# Patient Record
Sex: MALE | Race: OTHER | Employment: FULL TIME | URBAN - METROPOLITAN AREA
[De-identification: names, ages, dates, MRNs, and addresses within clinical notes are randomized per-mention and may not be internally consistent; named-entity substitution may affect disease eponyms.]

---

## 2023-04-18 ENCOUNTER — APPOINTMENT (OUTPATIENT)
Dept: CT IMAGING | Age: 33
End: 2023-04-18
Attending: EMERGENCY MEDICINE

## 2023-04-18 ENCOUNTER — HOSPITAL ENCOUNTER (EMERGENCY)
Age: 33
Discharge: HOME | End: 2023-04-18
Attending: EMERGENCY MEDICINE

## 2023-04-18 VITALS
WEIGHT: 224 LBS | DIASTOLIC BLOOD PRESSURE: 84 MMHG | HEIGHT: 75 IN | TEMPERATURE: 98 F | SYSTOLIC BLOOD PRESSURE: 139 MMHG | HEART RATE: 64 BPM | OXYGEN SATURATION: 99 % | RESPIRATION RATE: 16 BRPM | BODY MASS INDEX: 27.85 KG/M2

## 2023-04-18 DIAGNOSIS — E01.0 THYROMEGALY: ICD-10-CM

## 2023-04-18 DIAGNOSIS — R22.1 NECK SWELLING: Primary | ICD-10-CM

## 2023-04-18 LAB
ALBUMIN SERPL-MCNC: 4.2 G/DL (ref 3.5–5)
ALBUMIN/GLOB SERPL: 1.1 (ref 1.1–2.2)
ALP SERPL-CCNC: 88 U/L (ref 45–117)
ALT SERPL-CCNC: 100 U/L (ref 12–78)
ANION GAP SERPL CALC-SCNC: 3 MMOL/L (ref 5–15)
AST SERPL-CCNC: 32 U/L (ref 15–37)
BASOPHILS # BLD: 0.1 K/UL (ref 0–0.1)
BASOPHILS NFR BLD: 1 % (ref 0–1)
BILIRUB SERPL-MCNC: 0.4 MG/DL (ref 0.2–1)
BUN SERPL-MCNC: 17 MG/DL (ref 6–20)
BUN/CREAT SERPL: 15 (ref 12–20)
CALCIUM SERPL-MCNC: 9.3 MG/DL (ref 8.5–10.1)
CHLORIDE SERPL-SCNC: 107 MMOL/L (ref 97–108)
CO2 SERPL-SCNC: 29 MMOL/L (ref 21–32)
COMMENT, HOLDF: NORMAL
CREAT SERPL-MCNC: 1.11 MG/DL (ref 0.7–1.3)
DIFFERENTIAL METHOD BLD: NORMAL
EOSINOPHIL # BLD: 0.1 K/UL (ref 0–0.4)
EOSINOPHIL NFR BLD: 1 % (ref 0–7)
ERYTHROCYTE [DISTWIDTH] IN BLOOD BY AUTOMATED COUNT: 12.2 % (ref 11.5–14.5)
GLOBULIN SER CALC-MCNC: 3.7 G/DL (ref 2–4)
GLUCOSE SERPL-MCNC: 98 MG/DL (ref 65–100)
HCT VFR BLD AUTO: 44.1 % (ref 36.6–50.3)
HETEROPH AB BLD QL IA: NEGATIVE
HGB BLD-MCNC: 15 G/DL (ref 12.1–17)
IMM GRANULOCYTES # BLD AUTO: 0 K/UL (ref 0–0.04)
IMM GRANULOCYTES NFR BLD AUTO: 0 % (ref 0–0.5)
LYMPHOCYTES # BLD: 2.7 K/UL (ref 0.8–3.5)
LYMPHOCYTES NFR BLD: 36 % (ref 12–49)
MCH RBC QN AUTO: 29.1 PG (ref 26–34)
MCHC RBC AUTO-ENTMCNC: 34 G/DL (ref 30–36.5)
MCV RBC AUTO: 85.5 FL (ref 80–99)
MONOCYTES # BLD: 0.6 K/UL (ref 0–1)
MONOCYTES NFR BLD: 9 % (ref 5–13)
NEUTS SEG # BLD: 3.9 K/UL (ref 1.8–8)
NEUTS SEG NFR BLD: 53 % (ref 32–75)
NRBC # BLD: 0 K/UL (ref 0–0.01)
NRBC BLD-RTO: 0 PER 100 WBC
PLATELET # BLD AUTO: 205 K/UL (ref 150–400)
PMV BLD AUTO: 10.9 FL (ref 8.9–12.9)
POTASSIUM SERPL-SCNC: 3.8 MMOL/L (ref 3.5–5.1)
PROT SERPL-MCNC: 7.9 G/DL (ref 6.4–8.2)
RBC # BLD AUTO: 5.16 M/UL (ref 4.1–5.7)
SAMPLES BEING HELD,HOLD: NORMAL
SODIUM SERPL-SCNC: 139 MMOL/L (ref 136–145)
T4 FREE SERPL-MCNC: 0.8 NG/DL (ref 0.8–1.5)
TSH SERPL DL<=0.05 MIU/L-ACNC: 1.64 UIU/ML (ref 0.36–3.74)
WBC # BLD AUTO: 7.3 K/UL (ref 4.1–11.1)

## 2023-04-18 PROCEDURE — 84439 ASSAY OF FREE THYROXINE: CPT

## 2023-04-18 PROCEDURE — 74011250636 HC RX REV CODE- 250/636: Performed by: EMERGENCY MEDICINE

## 2023-04-18 PROCEDURE — 80053 COMPREHEN METABOLIC PANEL: CPT

## 2023-04-18 PROCEDURE — 74011000636 HC RX REV CODE- 636: Performed by: EMERGENCY MEDICINE

## 2023-04-18 PROCEDURE — 96361 HYDRATE IV INFUSION ADD-ON: CPT

## 2023-04-18 PROCEDURE — 99285 EMERGENCY DEPT VISIT HI MDM: CPT

## 2023-04-18 PROCEDURE — 84443 ASSAY THYROID STIM HORMONE: CPT

## 2023-04-18 PROCEDURE — 96360 HYDRATION IV INFUSION INIT: CPT

## 2023-04-18 PROCEDURE — 86308 HETEROPHILE ANTIBODY SCREEN: CPT

## 2023-04-18 PROCEDURE — 85025 COMPLETE CBC W/AUTO DIFF WBC: CPT

## 2023-04-18 PROCEDURE — 70491 CT SOFT TISSUE NECK W/DYE: CPT

## 2023-04-18 RX ADMIN — IOPAMIDOL 100 ML: 755 INJECTION, SOLUTION INTRAVENOUS at 19:34

## 2023-04-18 RX ADMIN — SODIUM CHLORIDE 1000 ML: 9 INJECTION, SOLUTION INTRAVENOUS at 18:37

## 2023-04-18 NOTE — ED TRIAGE NOTES
Pt arrives to the ER for complaints of swelling around neck that started about six months ago and reports about four days ago the swelling got worse. Denies difficulty swallowing or shortness of breath. Denies any PMH. DISPLAY PLAN FREE TEXT DISPLAY PLAN FREE TEXT DISPLAY PLAN FREE TEXT

## 2023-04-18 NOTE — ED PROVIDER NOTES
28-year-old male presents emergency department chief complaint Only. Patient states he started to notice the swelling proximally 6 months ago with some occasional discomfort. He states he had sudden onset of worsening of neck swelling over the last 4 days. He states he is able to swallow but is mildly painful due to discomfort. Also reports fatigue. Denies any fever or chills. Denies any known sick contacts. Reports that he is normally healthy    The history is provided by the patient. Neck Swelling   This is a new problem. The current episode started more than 2 days ago. The problem occurs constantly. The problem has been gradually worsening. The pain is associated with nothing. There has been no fever. The pain is present in the generalized neck. The quality of the pain is described as aching. Pertinent negatives include no weakness. No past medical history on file. No past surgical history on file. No family history on file. Social History     Socioeconomic History    Marital status:      Spouse name: Not on file    Number of children: Not on file    Years of education: Not on file    Highest education level: Not on file   Occupational History    Not on file   Tobacco Use    Smoking status: Not on file    Smokeless tobacco: Not on file   Substance and Sexual Activity    Alcohol use: Not on file    Drug use: Not on file    Sexual activity: Not on file   Other Topics Concern    Not on file   Social History Narrative    Not on file     Social Determinants of Health     Financial Resource Strain: Not on file   Food Insecurity: Not on file   Transportation Needs: Not on file   Physical Activity: Not on file   Stress: Not on file   Social Connections: Not on file   Intimate Partner Violence: Not on file   Housing Stability: Not on file         ALLERGIES: Patient has no known allergies. Review of Systems   Constitutional:  Positive for fatigue. Negative for activity change and fever. HENT:  Negative for sinus pressure, sinus pain and sore throat. Respiratory:  Negative for chest tightness and shortness of breath. Gastrointestinal:  Negative for abdominal pain. Endocrine: Negative for cold intolerance and heat intolerance. Musculoskeletal:  Negative for myalgias. Skin:  Negative for wound. Neurological:  Negative for weakness. Psychiatric/Behavioral:  The patient is not nervous/anxious. All other systems reviewed and are negative. Vitals:    04/18/23 1800 04/18/23 1801   BP:  139/84   Pulse:  64   Resp:  16   Temp: 98 °F (36.7 °C)    SpO2:  99%   Weight:  101.6 kg (224 lb)   Height:  6' 3\" (1.905 m)            Physical Exam  Vitals and nursing note reviewed. Constitutional:       Comments: Appears fatigued and slightly ill   HENT:      Head: Normocephalic and atraumatic. Nose: Nose normal.      Mouth/Throat:      Mouth: Mucous membranes are moist.   Eyes:      Extraocular Movements: Extraocular movements intact. Pupils: Pupils are equal, round, and reactive to light. Neck:      Comments: Questionable enlarged thyroid  Cardiovascular:      Rate and Rhythm: Normal rate and regular rhythm. Pulmonary:      Effort: Pulmonary effort is normal.      Breath sounds: Normal breath sounds. Abdominal:      General: Abdomen is flat. Bowel sounds are normal.   Musculoskeletal:      Cervical back: Tenderness present. No rigidity. Lymphadenopathy:      Cervical: Cervical adenopathy present. Skin:     Coloration: Skin is pale. Neurological:      General: No focal deficit present. Medical Decision Making  Amount and/or Complexity of Data Reviewed  Labs: ordered. Radiology: ordered. Risk  Prescription drug management. 28-year-old male presents to the emergency department chief complaint of neck swelling for last 1 month. He reports occasional fatigue but denies any recent infection. He normally is healthy. Physical exam appears somewhat fatigued. Anterior neck appears slightly swollen with possible adenopathy and enlarged thyroid. CBC is normal, chemistry panel is normal except for elevated GGT, TSH is normal, free T4 is pending, mononucleosis is negative. CT scan with contrast showed no adenopathy, does show thyromegaly with soft tissue nodules at the thoracic inlet. Patient given IV fluids and is resting comfortably. He was provided information on clinics in the area as well as family practice. He was advised to follow-up for his heart thyroid.        Procedures

## 2023-04-19 NOTE — DISCHARGE INSTRUCTIONS
[facility] SYSTEMS Departments     For adult and child immunizations, family planning, TB screening, STD testing and women's health services. [facility]: [place] [phone]      [facility] 25   [address]   [address]   170 [facility]: Cephus Ormond 200 [facility] [address] [phone]      [address]          [address] [place] [address]     For primary care services, woman and child wellness, and some clinics providing specialty care. [facility] -- [address] [facility] [phone]   411 [facility] 200 [facility] 3614 [facility] [phone]   339 [facility] [address] [phone]   [address]  [employer] [address] [place] [address] 5850  Community  [phone]   [address] [phone]   [facility] 81 [facility] [phone]   [facility] [address] [place] [address] [phone]   [facility]: [facility] [address], [address] [place] [address], [address] [phone]     [facility] [address] [facility] [address] [phone]   [facility] 5850  Community  [phone]   [employer]  [address], [place] [address] ([email or web address]) [phone]         Sexual Health/Woman Wellness Clinics    For STD/HIV testing and treatment, pregnancy testing and services, men's health, birth control services, LGBT services, and hepatitis/HPV vaccine services. [facility] for [place] [employer] 201 [address]. [facility] [address] [facility] 1579 [address] [phone]   [facility] [address], 5th floor [phone]   Pregnancy 3928 [facility] [address] for Women [facility] NEREYDA Mckeon [phone]         Specialty Service 1701 [facility]   [phone]   [place]   [phone]   Women, Infant and Children's Services: [address] [phone]       600 [facility]   [phone]   [address]   [facility]     [phone]   [address] Crisis   [address] [phone]       Local Primary Care Physicians  [facility] [phone]  MD Jalil Vazquez MD Angela Engman, MD [facility] [phone]  Nicholas Beatty, [facility]  Bianca Bennett, MD Ollie William, MD Macy Shankar [place] [address] [phone]  MD Pb Cooley MD [address] [facility] [phone]  MD Marielena Butts MD Mirian Labella, MD Christa Gupta MD   [facility] [phone]  JEANNEJEANNE PAREDES, MD López Chang MD  [facility], NP [address] [phone]  Jacinta Alaniz, MD Lorrie Grewal MD Rejeana Sousa, MD Earnesteen Burdock, MD Arnie Dryer, MD Albertina Naval, MD   33 [address] [facility]  Jefferson Quiroz MD [address] [facility] [phone]  Kylah Sifuentes, MD Telma Coleman, NP  MD Mary Kate Helton MD Hinda Raman, MD Guera Sadler, MD Omega Quintana MD   5057 [facility] [phone]  MD Ender Brooks President, [facility]  Azalea Libman, SERINA Harris, MD Aleshia Neville MD Roise Coons, MD [facility] [phone]  MD Dominguez Ridley MD Marleta Ann, MD Wynelle Carry, MD Baldemar Riis, MD   [address] [phone]  TreMD Jim Gayle MD [place] [phone]  MD Nicholas Dukes MD M[address] Boogie Strickland, [address] [facility] [phone]  Leonor Reich, MD Jerald Parker, MD Darrel Kearney, MD Cailin Garay, MD Chandra Schmitt, MD Leslie Estrada, NP  Arthur Ann MD 1619 [facility]   [phone]  MD Drew Weaver, MD Ras Adan MD   2102 [facility] [phone]  [facility], MD Durel Paget, P  Jazmine Barrow, PAJESS Barrow, FNP  Roxana Trejo, SUHAS Welch, MD Cassia Ruiz, NP   Lucio Robles, DO Miscellaneous:  Tg Alvarez [phone]

## 2023-09-06 NOTE — PROGRESS NOTES
Sendy Chandra is an 28 y.o. male who presents to establish care, and  Chief Complaint   Patient presents with    Establish Care     Ongoing swelling/ lump around the neck since April,  c/o pressure denies pain or trouble swallowing      Patient was previously receiving care at: just going to ER    Medical history significant for:    Denies any pmhx including HTN, DM, CV disease    Current complaints include:    #Neck swelling: Noticed in April and went to ER 4/18/23 and had normal labs including TSH and T4. CT neck at that time with \"Thyromegaly. There are soft tissue nodules at the thoracic inlet which may represent thyroid nodules or lymph nodes. \" Since then, states neck pressure is worse and size of thyroid is more. Sometimes has mild sensation of difficulty breathing d/t pressure in neck. Endorses diaphoresis at night as weight loss of 3-5lb since April. Denies palpitations, weight loss, diarrhea, difficulty swallowing. Denies fhx of thyroid disease or cancers. Preventive Care  Due for HIV, hep c screen    Social Hx  Alcohol: none  Tobacco: none  Illicit drug use: none    Current Medications  Current medications include:   No current outpatient medications on file. No current facility-administered medications for this visit. Allergies  No Known Allergies    Past Medical History  No past medical history on file. Past Surgical History   No past surgical history on file. Family History  No family history on file.     Social History  Social History     Socioeconomic History    Marital status:      Spouse name: Not on file    Number of children: Not on file    Years of education: Not on file    Highest education level: Not on file   Occupational History    Not on file   Tobacco Use    Smoking status: Never    Smokeless tobacco: Never   Substance and Sexual Activity    Alcohol use: Never    Drug use: Never    Sexual activity: Not on file   Other Topics Concern    Not on file   Social

## 2023-09-07 ENCOUNTER — OFFICE VISIT (OUTPATIENT)
Age: 33
End: 2023-09-07

## 2023-09-07 VITALS
TEMPERATURE: 98.2 F | HEART RATE: 98 BPM | HEIGHT: 72 IN | SYSTOLIC BLOOD PRESSURE: 135 MMHG | DIASTOLIC BLOOD PRESSURE: 83 MMHG | WEIGHT: 226 LBS | OXYGEN SATURATION: 100 % | BODY MASS INDEX: 30.61 KG/M2

## 2023-09-07 DIAGNOSIS — Z11.4 SCREENING FOR HIV WITHOUT PRESENCE OF RISK FACTORS: ICD-10-CM

## 2023-09-07 DIAGNOSIS — Z76.89 ENCOUNTER TO ESTABLISH CARE: ICD-10-CM

## 2023-09-07 DIAGNOSIS — Z11.59 NEED FOR HEPATITIS C SCREENING TEST: ICD-10-CM

## 2023-09-07 DIAGNOSIS — E01.0 THYROMEGALY: Primary | ICD-10-CM

## 2023-09-07 DIAGNOSIS — Z23 NEED FOR DIPHTHERIA-TETANUS-PERTUSSIS (TDAP) VACCINE: ICD-10-CM

## 2023-09-07 PROCEDURE — 90715 TDAP VACCINE 7 YRS/> IM: CPT

## 2023-09-07 PROCEDURE — 99204 OFFICE O/P NEW MOD 45 MIN: CPT

## 2023-09-07 ASSESSMENT — PATIENT HEALTH QUESTIONNAIRE - PHQ9
SUM OF ALL RESPONSES TO PHQ9 QUESTIONS 1 & 2: 4
1. LITTLE INTEREST OR PLEASURE IN DOING THINGS: 2
SUM OF ALL RESPONSES TO PHQ QUESTIONS 1-9: 4
2. FEELING DOWN, DEPRESSED OR HOPELESS: 2

## 2023-09-08 LAB
ALBUMIN SERPL-MCNC: 4.2 G/DL (ref 3.5–5)
ALBUMIN/GLOB SERPL: 1.1 (ref 1.1–2.2)
ALP SERPL-CCNC: 95 U/L (ref 45–117)
ALT SERPL-CCNC: 85 U/L (ref 12–78)
ANION GAP SERPL CALC-SCNC: 5 MMOL/L (ref 5–15)
AST SERPL-CCNC: 24 U/L (ref 15–37)
BILIRUB SERPL-MCNC: 0.3 MG/DL (ref 0.2–1)
BUN SERPL-MCNC: 12 MG/DL (ref 6–20)
BUN/CREAT SERPL: 11 (ref 12–20)
CALCIUM SERPL-MCNC: 9.3 MG/DL (ref 8.5–10.1)
CHLORIDE SERPL-SCNC: 107 MMOL/L (ref 97–108)
CO2 SERPL-SCNC: 29 MMOL/L (ref 21–32)
CREAT SERPL-MCNC: 1.07 MG/DL (ref 0.7–1.3)
ERYTHROCYTE [DISTWIDTH] IN BLOOD BY AUTOMATED COUNT: 12.6 % (ref 11.5–14.5)
GLOBULIN SER CALC-MCNC: 3.7 G/DL (ref 2–4)
GLUCOSE SERPL-MCNC: 97 MG/DL (ref 65–100)
HCT VFR BLD AUTO: 46.2 % (ref 36.6–50.3)
HCV AB SER IA-ACNC: 0.1 INDEX
HCV AB SERPL QL IA: NONREACTIVE
HGB BLD-MCNC: 15 G/DL (ref 12.1–17)
HIV 1+2 AB+HIV1 P24 AG SERPL QL IA: NONREACTIVE
HIV 1/2 RESULT COMMENT: NORMAL
MCH RBC QN AUTO: 28.5 PG (ref 26–34)
MCHC RBC AUTO-ENTMCNC: 32.5 G/DL (ref 30–36.5)
MCV RBC AUTO: 87.8 FL (ref 80–99)
NRBC # BLD: 0 K/UL (ref 0–0.01)
NRBC BLD-RTO: 0 PER 100 WBC
PLATELET # BLD AUTO: 232 K/UL (ref 150–400)
PMV BLD AUTO: 11 FL (ref 8.9–12.9)
POTASSIUM SERPL-SCNC: 3.9 MMOL/L (ref 3.5–5.1)
PROT SERPL-MCNC: 7.9 G/DL (ref 6.4–8.2)
RBC # BLD AUTO: 5.26 M/UL (ref 4.1–5.7)
SODIUM SERPL-SCNC: 141 MMOL/L (ref 136–145)
T4 FREE SERPL-MCNC: 0.8 NG/DL (ref 0.8–1.5)
TSH SERPL DL<=0.05 MIU/L-ACNC: 1.1 UIU/ML (ref 0.36–3.74)
WBC # BLD AUTO: 7.7 K/UL (ref 4.1–11.1)

## 2023-09-10 LAB
THYROGLOB AB SERPL-ACNC: <1 IU/ML (ref 0–0.9)
THYROPEROXIDASE AB SERPL-ACNC: 12 IU/ML (ref 0–34)

## 2023-09-22 ENCOUNTER — HOSPITAL ENCOUNTER (OUTPATIENT)
Facility: HOSPITAL | Age: 33
Discharge: HOME | End: 2023-09-22

## 2023-09-22 DIAGNOSIS — E01.0 THYROMEGALY: ICD-10-CM

## 2023-09-22 PROCEDURE — 76536 US EXAM OF HEAD AND NECK: CPT

## 2023-09-28 ENCOUNTER — TELEPHONE (OUTPATIENT)
Age: 33
End: 2023-09-28

## 2023-09-28 NOTE — TELEPHONE ENCOUNTER
Pt has requested a returned phone call to inform him of his U/S results and  Rx for pain medication. He also stated that the Endocrinologist that you referred him to cannot see him until next year. He is requested a new referral to another doctor. This writer attempted to schedule pt, several times, to schedule his f/u appt. Pt refused and stated \"just give doctor my message. \"    Thank you

## 2023-10-03 ENCOUNTER — TELEPHONE (OUTPATIENT)
Age: 33
End: 2023-10-03

## 2023-10-10 ENCOUNTER — OFFICE VISIT (OUTPATIENT)
Age: 33
End: 2023-10-10

## 2023-10-10 VITALS
DIASTOLIC BLOOD PRESSURE: 60 MMHG | BODY MASS INDEX: 28.14 KG/M2 | WEIGHT: 226.3 LBS | HEART RATE: 79 BPM | SYSTOLIC BLOOD PRESSURE: 124 MMHG | HEIGHT: 75 IN

## 2023-10-10 DIAGNOSIS — E04.9 GOITER: ICD-10-CM

## 2023-10-10 DIAGNOSIS — E04.1 THYROID NODULE: Primary | ICD-10-CM

## 2023-10-10 PROCEDURE — 99204 OFFICE O/P NEW MOD 45 MIN: CPT | Performed by: GENERAL ACUTE CARE HOSPITAL

## 2023-10-10 NOTE — PATIENT INSTRUCTIONS
Plan is to have the thyroid ultrasound report back and based on the results will decide on the next steps in management

## 2023-10-10 NOTE — PROGRESS NOTES
REFERRED BY: Yaneth Parker MD     REASON: Evaluation of thyroid nodule    CHIEF COMPLAINT: Thyroid nodule    HISTORY OF PRESENT ILLNESS:   Srinath Rogers is a 28 y.o. male with a PMHx as noted below who was referred to our endocrinology clinic for evaluation of a thyroid nodule. Patient describes that since 04/2023 he developed compressive symptoms, although he noticed neck sweeling for long time but he is not sure exactly when, was referred by his PCP after completing thyroid ultrasound \" report says in process and not completed yet\". He went to ER for diaphoresis and had CT showing retrosternal thyromegaly. He has dysphagia and neck pressure with some deep breathing difficulty that he feels is progressing. Thyroid function tests normal  Never on thyroid medications before  Family history is not significant for thyroid nodules or thyroid cancers. Patient denies any history of radiation exposure. They deny any dysphagia or dyspnea. Patient denies symptoms of hyper or hypothyroidism. Thyroid Ultrasound 09/22/2023  FINDINGS: Routine ultrasound imaging of the thyroid gland was performed. The  right thyroid lobe measures 5.4 x 2.4 x 2.9 cm. The left thyroid lobe measures  10.4 x 5.9 x 4.9 cm. The thyroid isthmus measures 33 mm in thickness. The  thyroid is heterogenous in appearance. IMPRESSION:  Significant diffuse thyromegaly with heterogenous thyroid parenchyma. Review of most recent thyroid function:  Lab Results   Component Value Date    TSH 1.64 04/18/2023      TSILT = Thyroid stimulating antibodies  TMCLT = TPO antibodies  T3LT = Total T3 levels    At this time they would like to further investigate the nature of the thyroid nodule. PAST HISTORY:   No past medical history on file. No past surgical history on file. MEDICATIONS   No current outpatient medications on file. FAMILY HISTORY:  No family history on file.     REVIEW OF SYSTEMS:   Complete ROS assessed and noted

## 2023-10-24 ENCOUNTER — TELEPHONE (OUTPATIENT)
Age: 33
End: 2023-10-24

## 2023-10-24 DIAGNOSIS — E04.1 THYROID NODULE: ICD-10-CM

## 2023-10-24 DIAGNOSIS — E04.9 GOITER: Primary | ICD-10-CM

## 2023-10-24 NOTE — TELEPHONE ENCOUNTER
Spoke with mr Arlette Jett today and discussed with him the PACS images of his thyroid most notably the 10.41 cm left thyroid lobe and isthmus 3.33 cm and right lobe 5.48 cm. Advised due to the compressive symptoms he is having and the rate of growth of his left thyroid gland, I recommend he have a total thyroidectomy, and referral given to Dr Angle Tang office, patient indicates understanding and agrees with plan.  To follow up with me after surgical consultation

## 2023-10-24 NOTE — TELEPHONE ENCOUNTER
Spoke with [facility] on 10/10/2023 about report still \"in process\", gave my direct phone, no response . I called today and spoke with ms Saba Galloway (radiology manager) and she indicates it will be resolved today and report will be in the system today.

## 2023-11-06 ENCOUNTER — TELEPHONE (OUTPATIENT)
Age: 33
End: 2023-11-06

## 2023-11-06 NOTE — TELEPHONE ENCOUNTER
Lm to see if patient wants to be put back on md schedule was rescheduled to another provider currently

## 2023-11-08 ENCOUNTER — PREP FOR PROCEDURE (OUTPATIENT)
Age: 33
End: 2023-11-08

## 2023-11-08 ENCOUNTER — OFFICE VISIT (OUTPATIENT)
Age: 33
End: 2023-11-08

## 2023-11-08 VITALS
OXYGEN SATURATION: 96 % | RESPIRATION RATE: 16 BRPM | WEIGHT: 226 LBS | DIASTOLIC BLOOD PRESSURE: 78 MMHG | TEMPERATURE: 98.6 F | SYSTOLIC BLOOD PRESSURE: 128 MMHG | BODY MASS INDEX: 28.1 KG/M2 | HEIGHT: 75 IN | HEART RATE: 72 BPM

## 2023-11-08 DIAGNOSIS — E01.0 THYROMEGALY: Primary | ICD-10-CM

## 2023-11-08 DIAGNOSIS — E01.0 THYROMEGALY: ICD-10-CM

## 2023-11-08 PROCEDURE — 99204 OFFICE O/P NEW MOD 45 MIN: CPT | Performed by: SURGERY

## 2023-11-08 ASSESSMENT — ENCOUNTER SYMPTOMS
ABDOMINAL PAIN: 0
EYE PAIN: 0
DIARRHEA: 0
SHORTNESS OF BREATH: 0
BACK PAIN: 0
BLOOD IN STOOL: 0
VOMITING: 0
STRIDOR: 0
CONSTIPATION: 0
NAUSEA: 0
SORE THROAT: 0
COUGH: 0
WHEEZING: 0

## 2023-11-08 ASSESSMENT — PATIENT HEALTH QUESTIONNAIRE - PHQ9
SUM OF ALL RESPONSES TO PHQ9 QUESTIONS 1 & 2: 2
1. LITTLE INTEREST OR PLEASURE IN DOING THINGS: 1
SUM OF ALL RESPONSES TO PHQ QUESTIONS 1-9: 2
SUM OF ALL RESPONSES TO PHQ QUESTIONS 1-9: 2
2. FEELING DOWN, DEPRESSED OR HOPELESS: 1
SUM OF ALL RESPONSES TO PHQ QUESTIONS 1-9: 2
SUM OF ALL RESPONSES TO PHQ QUESTIONS 1-9: 2

## 2023-11-08 NOTE — PROGRESS NOTES
Subjective:      Patient ID: Vianney Vickers is a 28 y.o. male who comes in for consultation by Monique Harley MD and Anay Bradford MD for an enlarging thyroid      Chief Complaint   Patient presents with    New Patient     Seen at the request of  to discuss thyroidectomy       HPI    He has had neck swelling for a long time but unclear exactly. In April 2023 it was getting larger and he had sweats went to the ER and had a CT noting thyromegaly. He has dysphagia and neck pressure with some breathing difficulty that was getting worse and was seen in September 2023 by Monique Harley MD and referred to Anay Bradford MD.   Thyroid function tests were ok. US noted a 10+cm left lobe and enlarged right lobe as well (see report below). He denies palpitations, voice changes, radiation exposure or family hx thyroid issues. He does use IV vitamins from the [employer] but unclear what is in it. History reviewed. No pertinent past medical history. History reviewed. No pertinent surgical history. Family History   Problem Relation Age of Onset    Diabetes Father      Social History     Tobacco Use    Smoking status: Never    Smokeless tobacco: Never   Vaping Use    Vaping Use: Never used   Substance Use Topics    Alcohol use: Never    Drug use: Never     No current outpatient medications on file. No current facility-administered medications for this visit. No Known Allergies      Review of Systems   Constitutional:  Positive for diaphoresis. Negative for chills, fatigue, fever and unexpected weight change. HENT:  Negative for congestion, ear pain and sore throat. Eyes:  Negative for pain. Respiratory:  Negative for cough, shortness of breath, wheezing and stridor. Cardiovascular:  Negative for chest pain, palpitations and leg swelling. Gastrointestinal:  Negative for abdominal pain, blood in stool, constipation, diarrhea, nausea and vomiting. Endocrine: Negative for polydipsia.

## 2023-11-14 PROBLEM — E01.0 THYROMEGALY: Status: ACTIVE | Noted: 2023-11-08

## 2023-11-14 PROBLEM — E04.9 GOITER: Status: ACTIVE | Noted: 2023-09-07

## 2023-12-13 NOTE — PERIOP NOTE
services #[phone] used to call time of arrival of 5:45am for surgery on 12/14/23. Patient verbalized understanding.

## 2023-12-14 ENCOUNTER — ANESTHESIA (OUTPATIENT)
Facility: HOSPITAL | Age: 33
End: 2023-12-14
Payer: COMMERCIAL

## 2023-12-14 ENCOUNTER — HOSPITAL ENCOUNTER (OUTPATIENT)
Facility: HOSPITAL | Age: 33
Discharge: HOME | End: 2023-12-15
Attending: SURGERY | Admitting: SURGERY
Payer: COMMERCIAL

## 2023-12-14 ENCOUNTER — ANESTHESIA EVENT (OUTPATIENT)
Facility: HOSPITAL | Age: 33
End: 2023-12-14
Payer: COMMERCIAL

## 2023-12-14 DIAGNOSIS — E01.0 THYROMEGALY: Primary | ICD-10-CM

## 2023-12-14 LAB
CALCIUM SERPL-MCNC: 8.4 MG/DL (ref 8.5–10.1)
CALCIUM SERPL-MCNC: 9.1 MG/DL (ref 8.5–10.1)

## 2023-12-14 PROCEDURE — 36415 COLL VENOUS BLD VENIPUNCTURE: CPT

## 2023-12-14 PROCEDURE — 3600000004 HC SURGERY LEVEL 4 BASE: Performed by: SURGERY

## 2023-12-14 PROCEDURE — 2580000003 HC RX 258: Performed by: SURGERY

## 2023-12-14 PROCEDURE — 2500000003 HC RX 250 WO HCPCS: Performed by: SURGERY

## 2023-12-14 PROCEDURE — 2580000003 HC RX 258: Performed by: ANESTHESIOLOGY

## 2023-12-14 PROCEDURE — 2709999900 HC NON-CHARGEABLE SUPPLY: Performed by: SURGERY

## 2023-12-14 PROCEDURE — 3700000001 HC ADD 15 MINUTES (ANESTHESIA): Performed by: SURGERY

## 2023-12-14 PROCEDURE — 6360000002 HC RX W HCPCS: Performed by: SURGERY

## 2023-12-14 PROCEDURE — 3600000014 HC SURGERY LEVEL 4 ADDTL 15MIN: Performed by: SURGERY

## 2023-12-14 PROCEDURE — 6360000002 HC RX W HCPCS: Performed by: REGISTERED NURSE

## 2023-12-14 PROCEDURE — 6370000000 HC RX 637 (ALT 250 FOR IP): Performed by: SURGERY

## 2023-12-14 PROCEDURE — 2500000003 HC RX 250 WO HCPCS: Performed by: REGISTERED NURSE

## 2023-12-14 PROCEDURE — 2580000003 HC RX 258: Performed by: REGISTERED NURSE

## 2023-12-14 PROCEDURE — 82310 ASSAY OF CALCIUM: CPT

## 2023-12-14 PROCEDURE — 7100000001 HC PACU RECOVERY - ADDTL 15 MIN: Performed by: SURGERY

## 2023-12-14 PROCEDURE — 2720000010 HC SURG SUPPLY STERILE: Performed by: SURGERY

## 2023-12-14 PROCEDURE — 3700000000 HC ANESTHESIA ATTENDED CARE: Performed by: SURGERY

## 2023-12-14 PROCEDURE — P9045 ALBUMIN (HUMAN), 5%, 250 ML: HCPCS | Performed by: REGISTERED NURSE

## 2023-12-14 PROCEDURE — 7100000000 HC PACU RECOVERY - FIRST 15 MIN: Performed by: SURGERY

## 2023-12-14 RX ORDER — CALCIUM CARBONATE 500 MG/1
500 TABLET, CHEWABLE ORAL 2 TIMES DAILY
Status: DISCONTINUED | OUTPATIENT
Start: 2023-12-14 | End: 2023-12-15 | Stop reason: HOSPADM

## 2023-12-14 RX ORDER — OXYCODONE HYDROCHLORIDE 5 MG/1
5 TABLET ORAL EVERY 4 HOURS PRN
Status: DISCONTINUED | OUTPATIENT
Start: 2023-12-14 | End: 2023-12-15 | Stop reason: HOSPADM

## 2023-12-14 RX ORDER — SODIUM CHLORIDE 9 MG/ML
INJECTION, SOLUTION INTRAVENOUS PRN
Status: DISCONTINUED | OUTPATIENT
Start: 2023-12-14 | End: 2023-12-15 | Stop reason: HOSPADM

## 2023-12-14 RX ORDER — ALBUMIN, HUMAN INJ 5% 5 %
SOLUTION INTRAVENOUS PRN
Status: DISCONTINUED | OUTPATIENT
Start: 2023-12-14 | End: 2023-12-14 | Stop reason: SDUPTHER

## 2023-12-14 RX ORDER — DEXTROSE MONOHYDRATE 100 MG/ML
INJECTION, SOLUTION INTRAVENOUS CONTINUOUS PRN
Status: DISCONTINUED | OUTPATIENT
Start: 2023-12-14 | End: 2023-12-14 | Stop reason: HOSPADM

## 2023-12-14 RX ORDER — HYDROMORPHONE HYDROCHLORIDE 1 MG/ML
0.5 INJECTION, SOLUTION INTRAMUSCULAR; INTRAVENOUS; SUBCUTANEOUS
Status: DISCONTINUED | OUTPATIENT
Start: 2023-12-14 | End: 2023-12-15 | Stop reason: HOSPADM

## 2023-12-14 RX ORDER — KETAMINE HCL IN NACL, ISO-OSM 100MG/10ML
SYRINGE (ML) INJECTION PRN
Status: DISCONTINUED | OUTPATIENT
Start: 2023-12-14 | End: 2023-12-14 | Stop reason: SDUPTHER

## 2023-12-14 RX ORDER — PROPOFOL 10 MG/ML
INJECTION, EMULSION INTRAVENOUS CONTINUOUS PRN
Status: DISCONTINUED | OUTPATIENT
Start: 2023-12-14 | End: 2023-12-14 | Stop reason: SDUPTHER

## 2023-12-14 RX ORDER — ONDANSETRON 2 MG/ML
4 INJECTION INTRAMUSCULAR; INTRAVENOUS EVERY 6 HOURS PRN
Status: DISCONTINUED | OUTPATIENT
Start: 2023-12-14 | End: 2023-12-15 | Stop reason: HOSPADM

## 2023-12-14 RX ORDER — DEXAMETHASONE SODIUM PHOSPHATE 4 MG/ML
INJECTION, SOLUTION INTRA-ARTICULAR; INTRALESIONAL; INTRAMUSCULAR; INTRAVENOUS; SOFT TISSUE PRN
Status: DISCONTINUED | OUTPATIENT
Start: 2023-12-14 | End: 2023-12-14 | Stop reason: SDUPTHER

## 2023-12-14 RX ORDER — SUCCINYLCHOLINE/SOD CL,ISO/PF 200MG/10ML
SYRINGE (ML) INTRAVENOUS PRN
Status: DISCONTINUED | OUTPATIENT
Start: 2023-12-14 | End: 2023-12-14 | Stop reason: SDUPTHER

## 2023-12-14 RX ORDER — ONDANSETRON 4 MG/1
4 TABLET, ORALLY DISINTEGRATING ORAL EVERY 8 HOURS PRN
Status: DISCONTINUED | OUTPATIENT
Start: 2023-12-14 | End: 2023-12-15 | Stop reason: HOSPADM

## 2023-12-14 RX ORDER — OXYCODONE HYDROCHLORIDE 5 MG/1
5 TABLET ORAL EVERY 6 HOURS PRN
Qty: 12 TABLET | Refills: 0 | Status: SHIPPED | OUTPATIENT
Start: 2023-12-14 | End: 2023-12-17

## 2023-12-14 RX ORDER — ACETAMINOPHEN 325 MG/1
650 TABLET ORAL EVERY 4 HOURS PRN
Status: DISCONTINUED | OUTPATIENT
Start: 2023-12-14 | End: 2023-12-15 | Stop reason: HOSPADM

## 2023-12-14 RX ORDER — PROCHLORPERAZINE EDISYLATE 5 MG/ML
5 INJECTION INTRAMUSCULAR; INTRAVENOUS
Status: DISCONTINUED | OUTPATIENT
Start: 2023-12-14 | End: 2023-12-14 | Stop reason: HOSPADM

## 2023-12-14 RX ORDER — PHENYLEPHRINE HCL IN 0.9% NACL 0.4MG/10ML
SYRINGE (ML) INTRAVENOUS PRN
Status: DISCONTINUED | OUTPATIENT
Start: 2023-12-14 | End: 2023-12-14 | Stop reason: SDUPTHER

## 2023-12-14 RX ORDER — ONDANSETRON 2 MG/ML
INJECTION INTRAMUSCULAR; INTRAVENOUS PRN
Status: DISCONTINUED | OUTPATIENT
Start: 2023-12-14 | End: 2023-12-14 | Stop reason: SDUPTHER

## 2023-12-14 RX ORDER — FENTANYL CITRATE 50 UG/ML
25 INJECTION, SOLUTION INTRAMUSCULAR; INTRAVENOUS EVERY 5 MIN PRN
Status: DISCONTINUED | OUTPATIENT
Start: 2023-12-14 | End: 2023-12-14 | Stop reason: HOSPADM

## 2023-12-14 RX ORDER — IPRATROPIUM BROMIDE AND ALBUTEROL SULFATE 2.5; .5 MG/3ML; MG/3ML
1 SOLUTION RESPIRATORY (INHALATION)
Status: DISCONTINUED | OUTPATIENT
Start: 2023-12-14 | End: 2023-12-14 | Stop reason: HOSPADM

## 2023-12-14 RX ORDER — HYDROMORPHONE HYDROCHLORIDE 1 MG/ML
0.25 INJECTION, SOLUTION INTRAMUSCULAR; INTRAVENOUS; SUBCUTANEOUS
Status: DISCONTINUED | OUTPATIENT
Start: 2023-12-14 | End: 2023-12-15 | Stop reason: HOSPADM

## 2023-12-14 RX ORDER — SODIUM CHLORIDE 0.9 % (FLUSH) 0.9 %
5-40 SYRINGE (ML) INJECTION EVERY 12 HOURS SCHEDULED
Status: DISCONTINUED | OUTPATIENT
Start: 2023-12-14 | End: 2023-12-15 | Stop reason: HOSPADM

## 2023-12-14 RX ORDER — SODIUM CHLORIDE, SODIUM LACTATE, POTASSIUM CHLORIDE, CALCIUM CHLORIDE 600; 310; 30; 20 MG/100ML; MG/100ML; MG/100ML; MG/100ML
INJECTION, SOLUTION INTRAVENOUS CONTINUOUS
Status: DISCONTINUED | OUTPATIENT
Start: 2023-12-14 | End: 2023-12-14 | Stop reason: HOSPADM

## 2023-12-14 RX ORDER — LEVOTHYROXINE SODIUM 0.07 MG/1
150 TABLET ORAL DAILY
Status: DISCONTINUED | OUTPATIENT
Start: 2023-12-15 | End: 2023-12-15 | Stop reason: HOSPADM

## 2023-12-14 RX ORDER — SODIUM CHLORIDE 0.9 % (FLUSH) 0.9 %
5-40 SYRINGE (ML) INJECTION PRN
Status: DISCONTINUED | OUTPATIENT
Start: 2023-12-14 | End: 2023-12-14 | Stop reason: HOSPADM

## 2023-12-14 RX ORDER — LEVOTHYROXINE SODIUM 0.15 MG/1
150 TABLET ORAL DAILY
Qty: 30 TABLET | Refills: 1 | Status: SHIPPED | OUTPATIENT
Start: 2023-12-14 | End: 2024-02-12

## 2023-12-14 RX ORDER — LIDOCAINE HYDROCHLORIDE 20 MG/ML
INJECTION, SOLUTION EPIDURAL; INFILTRATION; INTRACAUDAL; PERINEURAL PRN
Status: DISCONTINUED | OUTPATIENT
Start: 2023-12-14 | End: 2023-12-14 | Stop reason: SDUPTHER

## 2023-12-14 RX ORDER — FENTANYL CITRATE 50 UG/ML
INJECTION, SOLUTION INTRAMUSCULAR; INTRAVENOUS PRN
Status: DISCONTINUED | OUTPATIENT
Start: 2023-12-14 | End: 2023-12-14 | Stop reason: SDUPTHER

## 2023-12-14 RX ORDER — SODIUM CHLORIDE 0.9 % (FLUSH) 0.9 %
5-40 SYRINGE (ML) INJECTION EVERY 12 HOURS SCHEDULED
Status: DISCONTINUED | OUTPATIENT
Start: 2023-12-14 | End: 2023-12-14 | Stop reason: HOSPADM

## 2023-12-14 RX ORDER — SODIUM CHLORIDE 0.9 % (FLUSH) 0.9 %
5-40 SYRINGE (ML) INJECTION PRN
Status: DISCONTINUED | OUTPATIENT
Start: 2023-12-14 | End: 2023-12-15 | Stop reason: HOSPADM

## 2023-12-14 RX ORDER — DEXTROSE MONOHYDRATE, SODIUM CHLORIDE, AND POTASSIUM CHLORIDE 50; 1.49; 4.5 G/1000ML; G/1000ML; G/1000ML
INJECTION, SOLUTION INTRAVENOUS CONTINUOUS
Status: DISCONTINUED | OUTPATIENT
Start: 2023-12-14 | End: 2023-12-15 | Stop reason: HOSPADM

## 2023-12-14 RX ORDER — BUPIVACAINE HYDROCHLORIDE 5 MG/ML
INJECTION, SOLUTION PERINEURAL PRN
Status: DISCONTINUED | OUTPATIENT
Start: 2023-12-14 | End: 2023-12-14 | Stop reason: ALTCHOICE

## 2023-12-14 RX ORDER — MIDAZOLAM HYDROCHLORIDE 1 MG/ML
INJECTION INTRAMUSCULAR; INTRAVENOUS PRN
Status: DISCONTINUED | OUTPATIENT
Start: 2023-12-14 | End: 2023-12-14 | Stop reason: SDUPTHER

## 2023-12-14 RX ORDER — SODIUM CHLORIDE 9 MG/ML
INJECTION, SOLUTION INTRAVENOUS PRN
Status: DISCONTINUED | OUTPATIENT
Start: 2023-12-14 | End: 2023-12-14 | Stop reason: HOSPADM

## 2023-12-14 RX ORDER — ONDANSETRON 2 MG/ML
4 INJECTION INTRAMUSCULAR; INTRAVENOUS
Status: DISCONTINUED | OUTPATIENT
Start: 2023-12-14 | End: 2023-12-14 | Stop reason: HOSPADM

## 2023-12-14 RX ORDER — HYDROMORPHONE HYDROCHLORIDE 1 MG/ML
0.5 INJECTION, SOLUTION INTRAMUSCULAR; INTRAVENOUS; SUBCUTANEOUS EVERY 5 MIN PRN
Status: DISCONTINUED | OUTPATIENT
Start: 2023-12-14 | End: 2023-12-14 | Stop reason: HOSPADM

## 2023-12-14 RX ORDER — HYDROMORPHONE HYDROCHLORIDE 2 MG/ML
INJECTION, SOLUTION INTRAMUSCULAR; INTRAVENOUS; SUBCUTANEOUS PRN
Status: DISCONTINUED | OUTPATIENT
Start: 2023-12-14 | End: 2023-12-14 | Stop reason: SDUPTHER

## 2023-12-14 RX ADMIN — DEXAMETHASONE SODIUM PHOSPHATE 8 MG: 4 INJECTION INTRA-ARTICULAR; INTRALESIONAL; INTRAMUSCULAR; INTRAVENOUS; SOFT TISSUE at 08:05

## 2023-12-14 RX ADMIN — HYDROMORPHONE HYDROCHLORIDE 0.2 MG: 2 INJECTION INTRAMUSCULAR; INTRAVENOUS; SUBCUTANEOUS at 09:08

## 2023-12-14 RX ADMIN — Medication 10 MG: at 09:10

## 2023-12-14 RX ADMIN — HYDROMORPHONE HYDROCHLORIDE 0.5 MG: 1 INJECTION, SOLUTION INTRAMUSCULAR; INTRAVENOUS; SUBCUTANEOUS at 20:37

## 2023-12-14 RX ADMIN — SODIUM CHLORIDE, POTASSIUM CHLORIDE, SODIUM LACTATE AND CALCIUM CHLORIDE: 600; 310; 30; 20 INJECTION, SOLUTION INTRAVENOUS at 07:30

## 2023-12-14 RX ADMIN — FENTANYL CITRATE 50 MCG: 50 INJECTION, SOLUTION INTRAMUSCULAR; INTRAVENOUS at 07:40

## 2023-12-14 RX ADMIN — ONDANSETRON 4 MG: 2 INJECTION INTRAMUSCULAR; INTRAVENOUS at 10:28

## 2023-12-14 RX ADMIN — MIDAZOLAM HYDROCHLORIDE 2 MG: 1 INJECTION, SOLUTION INTRAMUSCULAR; INTRAVENOUS at 07:30

## 2023-12-14 RX ADMIN — Medication 80 MCG: at 08:33

## 2023-12-14 RX ADMIN — Medication 40 MCG: at 08:15

## 2023-12-14 RX ADMIN — WATER 2000 MG: 1 INJECTION INTRAMUSCULAR; INTRAVENOUS; SUBCUTANEOUS at 07:57

## 2023-12-14 RX ADMIN — PROPOFOL 200 MG: 10 INJECTION, EMULSION INTRAVENOUS at 07:40

## 2023-12-14 RX ADMIN — SODIUM CHLORIDE, POTASSIUM CHLORIDE, SODIUM LACTATE AND CALCIUM CHLORIDE: 600; 310; 30; 20 INJECTION, SOLUTION INTRAVENOUS at 09:44

## 2023-12-14 RX ADMIN — POTASSIUM CHLORIDE, DEXTROSE MONOHYDRATE AND SODIUM CHLORIDE: 150; 5; 450 INJECTION, SOLUTION INTRAVENOUS at 19:30

## 2023-12-14 RX ADMIN — Medication 20 MG: at 07:58

## 2023-12-14 RX ADMIN — Medication 40 MCG: at 08:31

## 2023-12-14 RX ADMIN — SODIUM CHLORIDE, PRESERVATIVE FREE 10 ML: 5 INJECTION INTRAVENOUS at 20:40

## 2023-12-14 RX ADMIN — PROPOFOL 40 MCG/KG/MIN: 10 INJECTION, EMULSION INTRAVENOUS at 07:46

## 2023-12-14 RX ADMIN — LIDOCAINE HYDROCHLORIDE 100 MG: 20 INJECTION, SOLUTION EPIDURAL; INFILTRATION; INTRACAUDAL; PERINEURAL at 07:40

## 2023-12-14 RX ADMIN — ALBUMIN (HUMAN) 12.5 G: 12.5 INJECTION, SOLUTION INTRAVENOUS at 09:30

## 2023-12-14 RX ADMIN — HYDROMORPHONE HYDROCHLORIDE 0.2 MG: 2 INJECTION INTRAMUSCULAR; INTRAVENOUS; SUBCUTANEOUS at 10:20

## 2023-12-14 RX ADMIN — HYDROMORPHONE HYDROCHLORIDE 0.2 MG: 2 INJECTION INTRAMUSCULAR; INTRAVENOUS; SUBCUTANEOUS at 09:38

## 2023-12-14 RX ADMIN — CALCIUM CARBONATE (ANTACID) CHEW TAB 500 MG 500 MG: 500 CHEW TAB at 20:37

## 2023-12-14 RX ADMIN — ALBUMIN (HUMAN) 12.5 G: 12.5 INJECTION, SOLUTION INTRAVENOUS at 09:04

## 2023-12-14 RX ADMIN — PHENYLEPHRINE HYDROCHLORIDE 40 MCG/MIN: 10 INJECTION INTRAVENOUS at 08:33

## 2023-12-14 RX ADMIN — Medication 200 MG: at 07:41

## 2023-12-14 RX ADMIN — PROPOFOL 30 MG: 10 INJECTION, EMULSION INTRAVENOUS at 08:23

## 2023-12-14 RX ADMIN — HYDROMORPHONE HYDROCHLORIDE 0.4 MG: 2 INJECTION INTRAMUSCULAR; INTRAVENOUS; SUBCUTANEOUS at 07:58

## 2023-12-14 ASSESSMENT — PAIN SCALES - GENERAL
PAINLEVEL_OUTOF10: 7
PAINLEVEL_OUTOF10: 6
PAINLEVEL_OUTOF10: 6

## 2023-12-14 ASSESSMENT — PAIN DESCRIPTION - LOCATION
LOCATION: NECK
LOCATION: NECK

## 2023-12-14 ASSESSMENT — PAIN - FUNCTIONAL ASSESSMENT: PAIN_FUNCTIONAL_ASSESSMENT: 0-10

## 2023-12-14 ASSESSMENT — PAIN DESCRIPTION - DESCRIPTORS
DESCRIPTORS: ACHING
DESCRIPTORS: SORE

## 2023-12-14 ASSESSMENT — PAIN DESCRIPTION - ORIENTATION: ORIENTATION: LEFT

## 2023-12-14 NOTE — BRIEF OP NOTE
Brief Postoperative Note      Patient: Carolyn Ba  YOB: 1990  MRN: [id number]    Date of Procedure: 12/14/2023    Pre-Op Diagnosis Codes: * Thyromegaly [E01.0]    Post-Op Diagnosis: Post-Op Diagnosis Codes:      * Thyromegaly [E01.0]       Procedure(s):  TOTAL THYROIDECTOMY WITH NIM TUBE    Surgeon(s):  Dorian Henry MD    Assistant:  * No surgical staff found *    Anesthesia: General    Estimated Blood Loss (mL): 143 ml    Complications: None    Specimens:   ID Type Source Tests Collected by Time Destination   1 : Total thyroid, stitch right upper pole Tissue Thyroid SURGICAL PATHOLOGY Dorian Henry MD 12/14/2023 0933    2 : Left perithyroidal lymph node Tissue Thyroid SURGICAL PATHOLOGY Dorian Henry MD 12/14/2023 1002    3 : Additional thyroid nodule Tissue Thyroid SURGICAL PATHOLOGY Dorian Henry MD 12/14/2023 1020        Implants:  * No implants in log *      Drains:   NG/OG/NJ/NE Tube Orogastric 18 fr (Active)       Findings: extremely large goiter L>>>R      Electronically signed by Rossy Merritt MD on 12/14/2023 at 10:52 AM

## 2023-12-14 NOTE — DISCHARGE INSTRUCTIONS
Discharge Instructions:  Thyroidectomy    Dr. Wilkes Poster    Call for appointment for follow up in 1 week [phone]    Activity:    Walk regularly. You may resume driving in 24 hours unless still requiring narcotics for pain. Work:    You may return to work in 11 - 7 days to light activity. No lifting more than 10 pounds for one week. Diet:    You may resume normal diet after 24 hours. Anesthesia and narcotics may cause nausea and vomiting. If persistent please call the office. Call if you have numbness or tingling around lips or fingertips as this is a sign of low calcium. Wound Care: You have a special dressing called Dermabond. It is okay to shower and let the water run over the incision but do not scrub the area or soak in a tub. If you have a small amount of drainage you may place a dry bandage over the wound and change it daily. If you experience a lot of drainage, develop redness around the wound, or a fever over 101 F occurs please call the office. Medications:    Resume home medications as indicated on the Medical Reconciliation form. Aspirin, Coumadin, and Plavix can be restarted on post operative day 2 if you were taking them preoperatively. Pain medications:  Non steroidal antiinflammatories seem to work best for post surgical pain. Try these first as prescribed. A narcotic prescription will also be given for breakthrough pain. Over the counter stool softeners and laxatives may be used if needed. Do not hesitate to call with questions or concerns. Take thyroid replacement medication (levothyroxine) at 0600 with glass of water and no other food/medicine for 60 minutes.

## 2023-12-14 NOTE — ANESTHESIA PRE PROCEDURE
normal exam                               Cardiovascular:Negative CV ROS                      Neuro/Psych:   Negative Neuro/Psych ROS              GI/Hepatic/Renal: Neg GI/Hepatic/Renal ROS            Endo/Other:                      ROS comment: goiter Abdominal:             Vascular: negative vascular ROS. Other Findings: Abdominal exam deferred            Anesthesia Plan      general     ASA 2     (glidescope)  Induction: intravenous. MIPS: Postoperative opioids intended and Prophylactic antiemetics administered. Anesthetic plan and risks discussed with patient. Use of blood products discussed with patient whom consented to blood products. Plan discussed with CRNA.     Attending anesthesiologist reviewed and agrees with Anjelica Leung MD   12/14/2023

## 2023-12-15 VITALS
HEART RATE: 76 BPM | SYSTOLIC BLOOD PRESSURE: 116 MMHG | BODY MASS INDEX: 28.45 KG/M2 | WEIGHT: 228.84 LBS | RESPIRATION RATE: 18 BRPM | DIASTOLIC BLOOD PRESSURE: 59 MMHG | HEIGHT: 75 IN | OXYGEN SATURATION: 97 % | TEMPERATURE: 98.1 F

## 2023-12-15 LAB
ANION GAP SERPL CALC-SCNC: 7 MMOL/L (ref 5–15)
BUN SERPL-MCNC: 13 MG/DL (ref 6–20)
BUN/CREAT SERPL: 13 (ref 12–20)
CALCIUM SERPL-MCNC: 8.8 MG/DL (ref 8.5–10.1)
CALCIUM SERPL-MCNC: 8.8 MG/DL (ref 8.5–10.1)
CALCIUM SERPL-MCNC: 9 MG/DL (ref 8.5–10.1)
CHLORIDE SERPL-SCNC: 108 MMOL/L (ref 97–108)
CO2 SERPL-SCNC: 24 MMOL/L (ref 21–32)
CREAT SERPL-MCNC: 1.01 MG/DL (ref 0.7–1.3)
GLUCOSE SERPL-MCNC: 157 MG/DL (ref 65–100)
POTASSIUM SERPL-SCNC: 4 MMOL/L (ref 3.5–5.1)
SODIUM SERPL-SCNC: 139 MMOL/L (ref 136–145)

## 2023-12-15 PROCEDURE — 6370000000 HC RX 637 (ALT 250 FOR IP): Performed by: SURGERY

## 2023-12-15 PROCEDURE — 82310 ASSAY OF CALCIUM: CPT

## 2023-12-15 PROCEDURE — 80048 BASIC METABOLIC PNL TOTAL CA: CPT

## 2023-12-15 PROCEDURE — 36415 COLL VENOUS BLD VENIPUNCTURE: CPT

## 2023-12-15 RX ORDER — CALCIUM CARBONATE 1000 MG/1
2 TABLET, CHEWABLE ORAL
Qty: 60 TABLET | Refills: 0 | Status: SHIPPED | OUTPATIENT
Start: 2023-12-15 | End: 2024-02-13

## 2023-12-15 RX ADMIN — LEVOTHYROXINE SODIUM 150 MCG: 0.07 TABLET ORAL at 05:20

## 2023-12-15 RX ADMIN — CALCIUM CARBONATE (ANTACID) CHEW TAB 500 MG 500 MG: 500 CHEW TAB at 09:05

## 2023-12-15 ASSESSMENT — PAIN SCALES - GENERAL: PAINLEVEL_OUTOF10: 0

## 2023-12-15 NOTE — PLAN OF CARE
Problem: Pain  Goal: Verbalizes/displays adequate comfort level or baseline comfort level  12/15/2023 1009 by Rey Coffey RN  Outcome: Adequate for Discharge  12/14/2023 2246 by Gabo Willis RN  Outcome: Progressing     Problem: Discharge Planning  Goal: Discharge to home or other facility with appropriate resources  12/15/2023 1009 by Rey Coffey RN  Outcome: Adequate for Discharge  12/14/2023 2246 by Gabo Willis RN  Outcome: Progressing     Problem: Safety - Adult  Goal: Free from fall injury  12/15/2023 1009 by Rey Coffey RN  Outcome: Adequate for Discharge  12/14/2023 2246 by Gabo Willis RN  Outcome: Progressing     Problem: ABCDS Injury Assessment  Goal: Absence of physical injury  Outcome: Adequate for Discharge

## 2023-12-15 NOTE — OP NOTE
[facility]  OPERATIVE REPORT    Name:  Lewis Gamez  MR#:  [id number]  :  1990  ACCOUNT #:  [de-identified]  DATE OF SERVICE:  2023    PREOPERATIVE DIAGNOSIS:  Very large goiter. POSTOPERATIVE DIAGNOSIS:  Very large goiter. PROCEDURE PERFORMED:  Neck exploration and total thyroidectomy. SURGEON:  Ernst Nuno MD    ASSISTANT:   Tiera Segovia. ANESTHESIA:  General with neural integrity monitoring tube. COMPLICATIONS:  None. SPECIMENS REMOVED:  1. Left total thyroid stitch at the right upper pole. 2.  Left perithyroidal lymph node that turned out to be thyroid tissue. 3.  Additional thyroid nodules on the left. IMPLANTS:  None. ESTIMATED BLOOD LOSS:  800 mL due to a large vascular gland. DRAINS:  None. FINDINGS:  Extremely large gland, looks more larger than right. There were some satellite nodules half the distance of it. BRIEF HISTORY:  The patient is a pleasant 69-year-old  male with a large goiter that was symptomatic. Options were discussed. He elected to have the area removed. He understands the risks and benefits and need for long term thyroid supplementation as well as risk for hypoparathyrodism as well as recurrent laryngeal nerve injury and bleeding. He now presents for that and understands the risks and wishes to procedure. PROCEDURE:  The patient was taken to the operating room and placed on the operating table in a supine position, underwent general anesthesia with neural integrity monitor tube and a roll was placed beneath the shoulders and neck was placed in mild hyperextension. Leads placed for the NIM tube on the chest wall. Then the neck was prepped and draped in the usual sterile fashion. After appropriate time-out and antibiotics were given, 0.5% Marcaine was infiltrated into the skin and subcutaneous tissues in the lower neck. A 3 cm incision was made along the Debbie's lines in the neck.

## 2023-12-15 NOTE — ANESTHESIA POSTPROCEDURE EVALUATION
Department of Anesthesiology  Postprocedure Note    Patient: Noma Kawasaki  MRN: [id number]  YOB: 1990  Date of evaluation: 12/15/2023    Procedure Summary       Date: 12/14/23 Room / Location: [facility] MAIN OR    Anesthesia Start: 0730 Anesthesia Stop: 1100    Procedure: TOTAL THYROIDECTOMY WITH NIM TUBE (Neck) Diagnosis:       Thyromegaly      (Thyromegaly [E01.0])    Providers: Noel Herbert MD Responsible Provider: Britton Suazo MD    Anesthesia Type: General ASA Status: 2            Anesthesia Type: General    Carl Phase I: Carl Score: 10    Carl Phase II:      Anesthesia Post Evaluation    Patient location during evaluation: PACU  Patient participation: complete - patient participated  Level of consciousness: awake and alert  Airway patency: patent  Nausea & Vomiting: no nausea  Cardiovascular status: hemodynamically stable  Respiratory status: acceptable  Hydration status: euvolemic        No notable events documented.

## 2023-12-15 NOTE — PROGRESS NOTES
End of Shift Note    Bedside shift change report given to Roselyn Aguillon (oncoming nurse) by Kristan Martinez RN (offgoing nurse). Report included the following information SBAR, Kardex, and MAR    Shift worked:  [id number]     Shift summary and any significant changes:     Dual skin completed, Ca lab drawn 5pm, next Ca lab draw due at 11pm. Pt voided x1 since coming too unit and before end of shift. Pt up to chair for dinner of clear liquids. Pt tolerated well, no N/V, regular diet for breakfast tomorrow. Pt given IS educated and teach back. Concerns for physician to address:       Zone phone for oncoming shift:   [phone]       Activity:     Number times ambulated in hallways past shift: 0  Number of times OOB to chair past shift: 0    Cardiac:   Cardiac Monitoring: No           Access:  Current line(s): PIV     Genitourinary:   Urinary status: voiding    Respiratory:      Chronic home O2 use?: NO  Incentive spirometer at bedside: YES       GI:     Current diet:  ADULT DIET; Clear Liquid  ADULT DIET;  Regular  Passing flatus: YES  Tolerating current diet: YES       Pain Management:   Patient states pain is manageable on current regimen: YES    Skin:     Interventions: increase time out of bed    Patient Safety:  Fall Score:    Interventions: gripper socks       Length of Stay:  Expected LOS:   Actual LOS: 0      Kristan Martinez RN

## 2023-12-15 NOTE — PROGRESS NOTES
DISCHARGE NOTE FROM [facility] NURSE    Patient determined to be stable for discharge by attending provider. I have reviewed the discharge instructions and follow-up appointments with the Patient and Spouse. They verbalized understanding and all questions were answered to their satisfaction. No complaints or further questions were expressed. Medications sent to pharmacy Appropriate educational materials and medication side effect teaching were provided. PIV were removed prior to discharge. Patient did not discharge with any line, melgoza, or drain. All personal items collected during admission were returned to the patient prior to discharge.     Post-op patient: Jemma Farrar RN

## 2023-12-22 PROBLEM — C73 FOLLICULAR THYROID CARCINOMA (HCC): Status: ACTIVE | Noted: 2023-12-22

## 2023-12-22 PROBLEM — E01.0 THYROMEGALY: Status: RESOLVED | Noted: 2023-11-08 | Resolved: 2023-12-22

## 2023-12-22 PROBLEM — E04.9 GOITER: Status: RESOLVED | Noted: 2023-09-07 | Resolved: 2023-12-22

## 2024-01-11 PROBLEM — E89.0 STATUS POST TOTAL THYROIDECTOMY: Status: ACTIVE | Noted: 2024-01-11

## 2024-01-11 PROBLEM — Z90.89 STATUS POST TOTAL THYROIDECTOMY: Status: ACTIVE | Noted: 2024-01-11

## 2024-01-11 PROBLEM — Z98.890 STATUS POST TOTAL THYROIDECTOMY: Status: ACTIVE | Noted: 2024-01-11

## 2024-01-11 NOTE — PROGRESS NOTES
[id number] [place] [address]   Office [phone], Fax [phone]      Chief Complaint:     Joss Veronica is a 33 y.o. male that presents for:   Chief Complaint   Patient presents with    Follow-up     Thyroidectomy       Subjective:   HPI:    #S/p Total Thyroidectomy:  Pt presents today for f/up after total thyroidectomy 12/14/23 with Dr. Eubanks of General Surgery. Indication for surgery was massive thyromegaly with compressive sx. Pathology showed Follicular Thyroid Carcinoma. Had post-op appt with Gen Surg 12/22/23, discussed results and likely need for RAMOS. F/up scheduled with Gen Surg on 2/27/24 who is checking thyroid labs prior to that appt. Also has new pt appt with Dr. Roberts of endocrinology on 2/1/24.     Today, pt feels well, states he feels normal. Denies pain, dysphagia, difficulty breathing, numbness/tingling. Voice is raspy but it is improving.    Past medical history, social history, medications, and allergies personally reviewed.  No past medical history on file.     Social Hx:   Social History     Socioeconomic History    Marital status:      Spouse name: None    Number of children: None    Years of education: None    Highest education level: None   Tobacco Use    Smoking status: Never    Smokeless tobacco: Never   Vaping Use    Vaping Use: Never used   Substance and Sexual Activity    Alcohol use: Never    Drug use: Never     Social Determinants of Health     Food Insecurity: No Food Insecurity (12/14/2023)    Hunger Vital Sign     Worried About Running Out of Food in the Last Year: Never true     Ran Out of Food in the Last Year: Never true   Transportation Needs: No Transportation Needs (12/14/2023)    PRAPARE - Transportation     Lack of Transportation (Medical): No     Lack of Transportation (Non-Medical): No   Housing Stability: Low Risk  (12/14/2023)    Housing Stability Vital Sign     Unable to Pay for Housing in the Last Year: No     Number of Places

## 2024-01-12 ENCOUNTER — OFFICE VISIT (OUTPATIENT)
Age: 34
End: 2024-01-12

## 2024-01-12 VITALS
BODY MASS INDEX: 28.1 KG/M2 | DIASTOLIC BLOOD PRESSURE: 69 MMHG | TEMPERATURE: 98 F | OXYGEN SATURATION: 98 % | WEIGHT: 226 LBS | HEIGHT: 75 IN | HEART RATE: 69 BPM | RESPIRATION RATE: 18 BRPM | SYSTOLIC BLOOD PRESSURE: 119 MMHG

## 2024-01-12 DIAGNOSIS — C73 FOLLICULAR THYROID CARCINOMA (HCC): Primary | ICD-10-CM

## 2024-01-12 DIAGNOSIS — E89.0 STATUS POST TOTAL THYROIDECTOMY: ICD-10-CM

## 2024-01-12 ASSESSMENT — PATIENT HEALTH QUESTIONNAIRE - PHQ9
1. LITTLE INTEREST OR PLEASURE IN DOING THINGS: 0
2. FEELING DOWN, DEPRESSED OR HOPELESS: 0
SUM OF ALL RESPONSES TO PHQ9 QUESTIONS 1 & 2: 0
SUM OF ALL RESPONSES TO PHQ QUESTIONS 1-9: 0

## 2024-01-12 NOTE — PROGRESS NOTES
Pt roomed by first and last name and .    Chief Complaint   Patient presents with    Follow-up     Thyroidectomy        Vitals:    24 1307   BP: 119/69   Pulse: 69   Resp: 18   Temp: 98 °F (36.7 °C)   TempSrc: Temporal   SpO2: 98%   Weight: 102.5 kg (226 lb)   Height: 1.905 m (6' 3\")        1. Have you been to the ER, urgent care clinic since your last visit?  Hospitalized since your last visit? Yes.  2023 had Thyroidectomy at [facility].    2. Have you seen or consulted any other health care providers outside of the [facility] since your last visit?  Include any pap smears or colon screening. No

## 2024-02-02 ENCOUNTER — TELEPHONE (OUTPATIENT)
Age: 34
End: 2024-02-02

## 2024-02-02 ENCOUNTER — OFFICE VISIT (OUTPATIENT)
Age: 34
End: 2024-02-02

## 2024-02-02 VITALS
HEIGHT: 75 IN | BODY MASS INDEX: 28.57 KG/M2 | OXYGEN SATURATION: 96 % | WEIGHT: 229.8 LBS | DIASTOLIC BLOOD PRESSURE: 73 MMHG | TEMPERATURE: 98.5 F | SYSTOLIC BLOOD PRESSURE: 116 MMHG | HEART RATE: 73 BPM | RESPIRATION RATE: 20 BRPM

## 2024-02-02 DIAGNOSIS — C73 FOLLICULAR THYROID CARCINOMA (HCC): Primary | ICD-10-CM

## 2024-02-02 PROCEDURE — 99024 POSTOP FOLLOW-UP VISIT: CPT | Performed by: SURGERY

## 2024-02-02 NOTE — PROGRESS NOTES
Surgery  Follow up    Procedure: total thyroidectomy  OR date:  12/14/2023  Path:    FINAL PATHOLOGIC DIAGNOSIS     1. Labeled as \"left perithyroidal lymph node\", excision:        Follicular thyroid carcinoma   No associated lymphoid tissue identified     2. Thyroid, total thyroidectomy:        Follicular thyroid carcinoma, 13 cm (see synoptic report)   Anterior margin positive for carcinoma   Lymphovascular invasion is present   Parathyroid tissue present     THYROID GLAND    SPECIMEN       Procedure: Total thyroidectomy    TUMOR       Tumor Focality: Unifocal    Tumor Characteristics       Tumor Site: Left lobe, Isthmus       Tumor Size: 13 cm       Histologic Type: Follicular carcinoma, widely invasive       Angioinvasion (vascular invasion): Present       Lymphatic Invasion: Present       Extrathyroidal Extension: Not identified       Margin Status: Carcinoma present at margin           Margin(s) Involved by Carcinoma: Anterior    REGIONAL LYMPH NODES       Regional Lymph Node Status: Not applicable (no regional lymph nodes         submitted or found)    DISTANT METASTASIS       Distant Site(s) Involved: Not applicable    PATHOLOGIC STAGE CLASSIFICATION (pTNM, AJCC 8th Edition)       pT Category: pT3a       pN Category: pN not assigned (no nodes submitted or found)            3. Additional thyroid nodule, excision:        Follicular thyroid carcinoma       S I feel fine, no pain, no dysphagia,  voice is improving, no paresthesias    /73 (Site: Left Upper Arm, Position: Sitting)   Pulse 73   Temp 98.5 °F (36.9 °C) (Oral)   Resp 20   Ht 1.905 m (6' 3\")   Wt 104.2 kg (229 lb 12.8 oz)   SpO2 96%   BMI 28.72 kg/m²     O Incisions healing well without infection   No signs of seroma   Cvostek negative   Mild dysphonia    Labs 1/1/8/2024  TSH 2.73  Free T4 1.38  Thyroglobulin 1056      A/P Doing well   Discussed path and likely need for RAMOS   Dysphonia improving   Very concerning thyroglobulin level!   Told

## 2024-02-02 NOTE — PROGRESS NOTES
Identified pt with two pt identifiers(name and ). Reviewed record in preparation for visit and have obtained necessary documentation. All patient medications has been reviewed.    Chief Complaint   Patient presents with    Post-Op Check     S/p Neck exploration and total thyroidectomy on 24       Health Maintenance Due   Topic    Hepatitis B vaccine (1 of 3 - 3-dose series)    COVID-19 Vaccine (1)    Varicella vaccine (1 of 2 - 2-dose childhood series)    Flu vaccine (1)       Vitals:    24 1101   BP: 116/73   Site: Left Upper Arm   Position: Sitting   Pulse: 73   Resp: 20   Temp: 98.5 °F (36.9 °C)   TempSrc: Oral   SpO2: 96%   Weight: 104.2 kg (229 lb 12.8 oz)   Height: 1.905 m (6' 3\")         4.Have you been to the ER, urgent care clinic since your last visit?  Hospitalized since your last visit? No      5. Have you seen or consulted any other health care providers outside of the [facility] since your last visit?  Include any pap smears or colon screening. No      Patient is accompanied by self I have received verbal consent from Jsos Veronica to discuss any/all medical information while they are present in the room.

## 2024-02-06 ENCOUNTER — OFFICE VISIT (OUTPATIENT)
Age: 34
End: 2024-02-06

## 2024-02-06 VITALS
HEART RATE: 89 BPM | SYSTOLIC BLOOD PRESSURE: 118 MMHG | DIASTOLIC BLOOD PRESSURE: 67 MMHG | BODY MASS INDEX: 28.37 KG/M2 | HEIGHT: 75 IN | WEIGHT: 228.2 LBS

## 2024-02-06 DIAGNOSIS — C73 FOLLICULAR THYROID CARCINOMA (HCC): Primary | ICD-10-CM

## 2024-02-06 DIAGNOSIS — E89.0 POSTOPERATIVE HYPOTHYROIDISM: ICD-10-CM

## 2024-02-06 DIAGNOSIS — C73 THYROID CANCER (HCC): ICD-10-CM

## 2024-02-06 RX ORDER — LEVOTHYROXINE SODIUM 175 UG/1
175 TABLET ORAL DAILY
Qty: 90 TABLET | Refills: 1 | Status: SHIPPED | OUTPATIENT
Start: 2024-02-06

## 2024-02-06 NOTE — PATIENT INSTRUCTIONS
Stop levothyroxine 150mcg and START levothyroxine 175mcg daily    Complete blood test today and based on the results will decide on the next steps in management     Complete blood test in 8 weeks     Levothyroxine medication instructions:  Please take levothyroxine with a glass of water only, on an empty stomach each morning, 1 hour prior to ingesting any other medications (including vitamins), food, coffee, tea, juice or any other beverages.   If you use antacids, medicine to treat high cholesterol (including cholestyramine, colesevelam, colestipol), orlistat, sevelamer, sucralfate, stomach medicine (including lansoprazole, omeprazole, pantoprazole), or any medicine that contains calcium or iron, please take it at least 4 hours before or 4 hours after you take levothyroxine.  Cottonseed meal, dietary fiber, soybean flour or walnuts may decrease the absorption of this medicine.  All of these can reduce the absorption of thyroid hormone tablets and result in fluctuating levels in the blood and on labs, affecting also how one feels.  Please do not eat grapefruit or drink grapefruit juice while you are using this medicine.  If you miss a dose you can take it as soon as you remember. However, if it is almost time for your next dose, wait until then and take a regular dose. Do not take extra medicine to make up for a missed dose.  Store the medicine in a closed container at room temperature, away from heat, moisture, and direct light. Please keep out of children's reach.  You may have to take this medicine for 6 to 8 weeks before your symptoms start to get better.

## 2024-02-06 NOTE — PROGRESS NOTES
REFERRED BY: Shahbaz Hubbard MD     REASON: Evaluation of thyroid nodule    CHIEF COMPLAINT: Thyroid nodule    HISTORY OF PRESENT ILLNESS:   Joss Veronica is a 33 y.o. male with a PMHx as noted below who was referred to our endocrinology clinic for evaluation of a thyroid nodule.    2/6/24     Mr Balderas had total thyroidectomy on 12/14/2023 with dr Eubanks    FINAL PATHOLOGIC DIAGNOSIS     1. \"left perithyroidal lymph node\":  Follicular thyroid carcinoma   No associated lymphoid tissue identified     2. Thyroid, total thyroidectomy:        Follicular thyroid carcinoma, 13 cm   Anterior margin positive for carcinoma   Lymphovascular invasion is present   Parathyroid tissue present     TUMOR   -Unifocal    - Left lobe, Isthmus    - Tumor Size: 13 cm    - Histologic Type: Follicular carcinoma, widely invasive    - Angioinvasion (vascular invasion): Present    - Lymphatic Invasion: Present    - Extrathyroidal Extension: Not identified    - Margin Status: Carcinoma present at margin    -Margin(s) Involved by Carcinoma: Anterior    - Regional Lymph Node Status: no regional lymph nodes         submitted or found     DISTANT METASTASIS       Distant Site(s) Involved: Not applicable    PATHOLOGIC STAGE CLASSIFICATION (pTNM, AJCC 8th Edition)       pT Category: pT3a       pN Category: pN not assigned (no nodes submitted or found)        Labs 1/1/8/2024  TSH 2.73  Free T4 1.38  Thyroglobulin 1056            10/10/2023  Patient describes that since 04/2023 he developed compressive symptoms, although he noticed neck sweeling for long time but he is not sure exactly when, was referred by his PCP after completing thyroid ultrasound \" report says in process and not completed yet\".  He went to ER for diaphoresis and had CT showing retrosternal thyromegaly.    He has dysphagia and neck pressure with some deep breathing difficulty that he feels is progressing.  Thyroid function tests normal  Never on thyroid medications

## 2024-02-07 LAB
T4 FREE SERPL-MCNC: 1 NG/DL (ref 0.8–1.5)
TSH SERPL DL<=0.05 MIU/L-ACNC: 3.68 UIU/ML (ref 0.36–3.74)

## 2024-02-09 LAB
THYROGLOB AB SERPL-ACNC: <1 IU/ML (ref 0–0.9)
THYROGLOBULIN: 1191 NG/ML (ref 1.4–29.2)

## 2024-02-29 DIAGNOSIS — C73 FOLLICULAR THYROID CARCINOMA (HCC): ICD-10-CM

## 2024-03-02 ENCOUNTER — HOSPITAL ENCOUNTER (OUTPATIENT)
Facility: HOSPITAL | Age: 34
Discharge: HOME | End: 2024-03-02
Attending: GENERAL ACUTE CARE HOSPITAL
Payer: COMMERCIAL

## 2024-03-02 DIAGNOSIS — C73 FOLLICULAR THYROID CARCINOMA (HCC): ICD-10-CM

## 2024-03-02 PROCEDURE — A9609 HC RX DIAGNOSTIC RADIOPHARMACEUTICAL: HCPCS | Performed by: GENERAL ACUTE CARE HOSPITAL

## 2024-03-02 PROCEDURE — 3430000000 HC RX DIAGNOSTIC RADIOPHARMACEUTICAL: Performed by: GENERAL ACUTE CARE HOSPITAL

## 2024-03-02 PROCEDURE — 78816 PET IMAGE W/CT FULL BODY: CPT

## 2024-03-02 RX ORDER — FLUDEOXYGLUCOSE F-18 500 MCI/ML
13.34 INJECTION INTRAVENOUS
Status: DISCONTINUED | OUTPATIENT
Start: 2024-03-02 | End: 2024-03-02

## 2024-03-02 RX ORDER — FLUDEOXYGLUCOSE F-18 500 MCI/ML
10 INJECTION INTRAVENOUS
Status: COMPLETED | OUTPATIENT
Start: 2024-03-02 | End: 2024-03-02

## 2024-03-02 RX ADMIN — FLUDEOXYGLUCOSE F-18 13.8 MILLICURIE: 500 INJECTION INTRAVENOUS at 09:54

## 2024-03-06 DIAGNOSIS — E89.0 POSTOPERATIVE HYPOTHYROIDISM: ICD-10-CM

## 2024-03-07 LAB
T4 FREE SERPL-MCNC: 1.2 NG/DL (ref 0.8–1.5)
TSH SERPL DL<=0.05 MIU/L-ACNC: 3.29 UIU/ML (ref 0.36–3.74)

## 2024-03-13 DIAGNOSIS — C73 FOLLICULAR THYROID CARCINOMA (HCC): Primary | ICD-10-CM

## 2024-03-13 LAB
THYROGLOBULIN (ICMA): 1382.4 NG/ML
THYROGLOBULIN (TG-RIA): ABNORMAL NG/ML
THYROGLOBULIN AB: <1 IU/ML

## 2024-03-14 ENCOUNTER — TELEPHONE (OUTPATIENT)
Age: 34
End: 2024-03-14

## 2024-03-14 DIAGNOSIS — C73 FOLLICULAR THYROID CARCINOMA (HCC): Primary | ICD-10-CM

## 2024-03-14 RX ORDER — ONDANSETRON 2 MG/ML
8 INJECTION INTRAMUSCULAR; INTRAVENOUS
OUTPATIENT
Start: 2024-03-25

## 2024-03-14 RX ORDER — FAMOTIDINE 10 MG/ML
20 INJECTION, SOLUTION INTRAVENOUS
OUTPATIENT
Start: 2024-03-25

## 2024-03-14 RX ORDER — EPINEPHRINE 1 MG/ML
0.3 INJECTION, SOLUTION, CONCENTRATE INTRAVENOUS PRN
OUTPATIENT
Start: 2024-03-25

## 2024-03-14 RX ORDER — SODIUM CHLORIDE 9 MG/ML
INJECTION, SOLUTION INTRAVENOUS CONTINUOUS
OUTPATIENT
Start: 2024-03-25

## 2024-03-14 RX ORDER — ACETAMINOPHEN 325 MG/1
650 TABLET ORAL
OUTPATIENT
Start: 2024-03-25

## 2024-03-14 RX ORDER — ALBUTEROL SULFATE 90 UG/1
4 AEROSOL, METERED RESPIRATORY (INHALATION) PRN
OUTPATIENT
Start: 2024-03-25

## 2024-03-14 RX ORDER — LEVOTHYROXINE SODIUM 0.2 MG/1
TABLET ORAL
Qty: 30 TABLET | Refills: 11 | Status: SHIPPED | OUTPATIENT
Start: 2024-03-14

## 2024-03-14 RX ORDER — DIPHENHYDRAMINE HYDROCHLORIDE 50 MG/ML
50 INJECTION INTRAMUSCULAR; INTRAVENOUS
OUTPATIENT
Start: 2024-03-25

## 2024-03-14 NOTE — TELEPHONE ENCOUNTER
Discussed with mr Balderas his PET scan results and plan for RAMOS, indicates his family will be away from home from 03/25/2024 to 04/06/2024 and he would like to have that completed then to avoid exposing to RAMOS, patient indicates understanding and agrees with plan.   -------------------------------------------------------------------    Can you pls call the nuclear medicine department at [place] and infusion center at [place] to see if the following schedule will work:       1) Thyrogen on Monday 03/25/2024  2) Thyrogen on Tuesday 03/26/2024  3) Low dose RAMOS on Wednesday 03/27/2024   4) Whole body scan on Wednesday 1 week later 04/03/2024      I have placed the order for thyrogen in the therapy section and RAMOS and the whole body scan order

## 2024-03-14 NOTE — TELEPHONE ENCOUNTER
I spoke with Allison in Scheduling and to schedule the first available for the whole body scan is April 8th and 10th so the schedule will not fit .

## 2024-03-17 NOTE — TELEPHONE ENCOUNTER
Contacted Nuclear medicine on Thursday 3/14 and Friday 3/15 to incquire about timing of WBS in relation of RAMOS given it will be longer than 1 week, awaiting call back.

## 2024-03-18 NOTE — TELEPHONE ENCOUNTER
Received a call from Anahi in scheduling.  She stated that she was calling per the Radiology dept and they wanted to know if Dr. Roberts wanted the patient to have I-31 total body and does the patient need treatment.  She stated that they are requesting a new order if he does.

## 2024-03-18 NOTE — TELEPHONE ENCOUNTER
Spoke with Dr Ramos (Nuclear Medicine) indicates it is ok to do the WBS > 7 days after the RAMOS, patient can be scheduled as follows:    1) Thyrogen on Monday 03/25/2024  2) Thyrogen on Tuesday 03/26/2024  3) Low dose RAMOS on Wednesday 03/27/2024   4) Whole body scan on Wednesday 1 week later 04/08/2024      I have placed the order for thyrogen in the therapy section and RAMOS and the whole body scan order

## 2024-03-20 NOTE — TELEPHONE ENCOUNTER
Patient was notified of his appt dates and he would like the information sent to him through [employer].  I told him I would but Im going to call the dept for the time that he should arrive.

## 2024-03-21 NOTE — TELEPHONE ENCOUNTER
Dr. Roberts what is the time for the RAMOS and the whole body scan.  Patient wants me to forward this information through [employer].  His Thyrogen is at [facility] at  2:00

## 2024-03-21 NOTE — TELEPHONE ENCOUNTER
I called patient and notified him of his dates and times.  He is aware that his Thyrogen is at [facility] and the RAMOS and whole body scan is at [facility].  The RAMOS is at 10 am on 3/27 and the body scan is at 1:00 pm on 4/8.  Patient offers no questions at this time.

## 2024-03-22 ENCOUNTER — TELEPHONE (OUTPATIENT)
Age: 34
End: 2024-03-22

## 2024-03-22 NOTE — TELEPHONE ENCOUNTER
Messaged ms Rosaura Petty (PharmD) to follow recommendations of nuclear medicine specialist given v high thyroglobulin level s/p total thyroidectomy and large residual of thyroid cells that patient will likely require a higher dose of RAMOS, she indicates understanding

## 2024-03-22 NOTE — TELEPHONE ENCOUNTER
3/22/2024  2:16 PM  [facility] Scheduling is calling  -Pt is having a procedure done; full body scan  -Wanting to know the dosing for the pt;   -Will  provide the dosing or the facility provide it for the pt    Appt is R/Sed to  04/03/2024 @1PM    Please advise     Thank You,  Stephanie Trejo

## 2024-03-25 ENCOUNTER — HOSPITAL ENCOUNTER (OUTPATIENT)
Facility: HOSPITAL | Age: 34
Setting detail: INFUSION SERIES
Discharge: HOME | End: 2024-03-25

## 2024-03-25 VITALS
HEART RATE: 74 BPM | TEMPERATURE: 99.3 F | SYSTOLIC BLOOD PRESSURE: 117 MMHG | OXYGEN SATURATION: 99 % | DIASTOLIC BLOOD PRESSURE: 70 MMHG | RESPIRATION RATE: 16 BRPM

## 2024-03-25 DIAGNOSIS — C73 FOLLICULAR THYROID CARCINOMA (HCC): Primary | ICD-10-CM

## 2024-03-25 PROCEDURE — 96372 THER/PROPH/DIAG INJ SC/IM: CPT

## 2024-03-25 PROCEDURE — 2580000003 HC RX 258: Performed by: GENERAL ACUTE CARE HOSPITAL

## 2024-03-25 PROCEDURE — 6360000002 HC RX W HCPCS: Performed by: GENERAL ACUTE CARE HOSPITAL

## 2024-03-25 RX ORDER — EPINEPHRINE 1 MG/ML
0.3 INJECTION, SOLUTION INTRAMUSCULAR; SUBCUTANEOUS PRN
Status: CANCELLED | OUTPATIENT
Start: 2024-03-26

## 2024-03-25 RX ORDER — SODIUM CHLORIDE 9 MG/ML
INJECTION, SOLUTION INTRAVENOUS CONTINUOUS
Status: CANCELLED | OUTPATIENT
Start: 2024-03-26

## 2024-03-25 RX ORDER — ALBUTEROL SULFATE 90 UG/1
4 AEROSOL, METERED RESPIRATORY (INHALATION) PRN
Status: CANCELLED | OUTPATIENT
Start: 2024-03-26

## 2024-03-25 RX ORDER — ACETAMINOPHEN 325 MG/1
650 TABLET ORAL
Status: CANCELLED | OUTPATIENT
Start: 2024-03-26

## 2024-03-25 RX ORDER — DIPHENHYDRAMINE HYDROCHLORIDE 50 MG/ML
50 INJECTION INTRAMUSCULAR; INTRAVENOUS
Status: CANCELLED | OUTPATIENT
Start: 2024-03-26

## 2024-03-25 RX ORDER — ONDANSETRON 2 MG/ML
8 INJECTION INTRAMUSCULAR; INTRAVENOUS
Status: CANCELLED | OUTPATIENT
Start: 2024-03-26

## 2024-03-25 RX ADMIN — THYROTROPIN ALFA 0.9 MG: 0.9 INJECTION, POWDER, FOR SOLUTION INTRAMUSCULAR at 13:47

## 2024-03-25 NOTE — PROGRESS NOTES
[facility] Progress Note  Date: March 25, 2024     Treatment: Thyrogen    Mr. Andrey Veronica arrived in no acute distress at 1325.    Patient COVID Screening completed:  Do you have any symptoms of COVID-19? SOB, coughing, fever, or generally not feeling well? NO  Have you been exposed to COVID-19 recently? NO  Have you had any recent contact with family/friend that has a pending COVID test? NO    Assessment was unremarkable with no concerns voiced.      Mr. Andrey Veronica's vitals for today's visit.   Patient Vitals for the past 12 hrs:   Temp Pulse Resp BP SpO2   03/25/24 1326 99.3 °F (37.4 °C) 74 16 117/70 99 %     Medications given:  Medications Administered         thyrotropin earline (THYROGEN) 0.9 mg in sterile water 1 mL injection Admin Date  03/25/2024 Action  Given Dose  0.9 mg Route  IntraMUSCular Administered By  Deborah Yoo RN          Injection given in L gluteal    Mr. Andrey Veronica tolerated the treatment without complaints.    [facility]. Andrey [facility] in stable condition at 1350.     Future Appointments   Date Time Provider Department Center   3/26/2024  2:00 PM [facility] INFUSION NURSE 2 [facility]   3/27/2024 10:00 AM [facility] NM CONSULT 1 [facility]   4/3/2024  1:00 PM [facility] NM RM 3 [facility]   7/1/2024  4:30 PM Doris Roberts MD RDE ARYA 332 BS STEFAN Polanco RN  March 25, 2024  2:08 PM

## 2024-03-25 NOTE — DISCHARGE INSTRUCTIONS
Thyrotropin Ravi Injection  Kandi medicamento se usa para las siguientes afecciones:  prueba de diagnóstico  cáncer  Melo: Thyrogen  Nombre genérico: Thyrotropin Ravi  Instrucciones  Kandi medicamento se administra en forma de inyección en un músculo.  Kandi medicamento debe administrarlo un proveedor de atención médica entrenado.  Si olvidó bipin izabella dosis, llame al médico para pedirle instrucciones.  Las interacciones con otros medicamentos pueden cambiar la forma en que actúan los medicamentos o aumentar el riesgo de presentar efectos secundarios. Informe a oli profesionales sanitarios acerca de todos los medicamentos que usa. [place] incluye medicamentos con y sin receta médica, vitaminas y medicamentos a base de hierbas. Hable con mcgee médico o farmacéutico antes de empezar o dejar de usar cualquier medicamento.  Siga usando kandi medicamento kerry el número total de días que se lo recetaron. No deje de usar el medicamento, incluso si empieza a sentirse mejor.  Es muy importante que asista a todas las citas para evaluaciones y pruebas médicas mientras usa kandi medicamento.  Advertencias  Informe a mcgee médico y a mcgee farmacéutico si alguna vez ha tenido izabella reacción alérgica a un medicamento.  Kandi medicamento puede afectar mcgee capacidad de mantenerse alerta o de reaccionar con rapidez. No maneje ni opere máquinas hasta que sepa qué efecto le provocará kandi medicamento.  Consulte a mcgee médico antes de beber alcohol mientras usa kandi medicamento.  Informe a mcgee médico o farmacéutico si está o planea quedar embarazada, o si está amamantando.  No amamante mientras esté usando kandi medicamento.  No use kandi medicamento si está embarazada. Si queda embarazada, comuníquese con mcgee médico inmediatamente.  Efectos secundarios  La siguiente es izabella lista de algunos efectos secundarios comunes de kandi medicamento. Hable con el médico para saber qué debe hacer en vanna de tener estos u otros efectos secundarios.  mareos  sahra de  atención de viet antes de bipin cualquier medicamento, hacer cambios en la alimentación o comenzar o interrumpir tratamiento alguno. La exhibición y el uso de esta información sobre medicamentos están sujetos a Condiciones de uso expresas.  Las instrucciones de cuidado fueron adaptadas bajo licencia por [facility]. Si usted tiene preguntas sobre izabella afección médica o sobre estas instrucciones, siempre pregunte a mcgee profesional de viet. [facility] niega toda garantía o responsabilidad por mcgee uso de esta información.

## 2024-03-26 ENCOUNTER — HOSPITAL ENCOUNTER (OUTPATIENT)
Facility: HOSPITAL | Age: 34
Setting detail: INFUSION SERIES
Discharge: HOME | End: 2024-03-26

## 2024-03-26 VITALS
HEART RATE: 74 BPM | DIASTOLIC BLOOD PRESSURE: 69 MMHG | SYSTOLIC BLOOD PRESSURE: 123 MMHG | OXYGEN SATURATION: 98 % | RESPIRATION RATE: 16 BRPM | TEMPERATURE: 98.1 F

## 2024-03-26 DIAGNOSIS — C73 FOLLICULAR THYROID CARCINOMA (HCC): Primary | ICD-10-CM

## 2024-03-26 PROCEDURE — 2580000003 HC RX 258: Performed by: GENERAL ACUTE CARE HOSPITAL

## 2024-03-26 PROCEDURE — 6360000002 HC RX W HCPCS: Performed by: GENERAL ACUTE CARE HOSPITAL

## 2024-03-26 PROCEDURE — 96372 THER/PROPH/DIAG INJ SC/IM: CPT

## 2024-03-26 RX ORDER — ALBUTEROL SULFATE 90 UG/1
4 AEROSOL, METERED RESPIRATORY (INHALATION) PRN
Status: DISCONTINUED | OUTPATIENT
Start: 2024-03-26 | End: 2024-03-27 | Stop reason: HOSPADM

## 2024-03-26 RX ORDER — SODIUM CHLORIDE 9 MG/ML
INJECTION, SOLUTION INTRAVENOUS CONTINUOUS
Status: DISCONTINUED | OUTPATIENT
Start: 2024-03-26 | End: 2024-03-27 | Stop reason: HOSPADM

## 2024-03-26 RX ORDER — ONDANSETRON 2 MG/ML
8 INJECTION INTRAMUSCULAR; INTRAVENOUS
Status: DISCONTINUED | OUTPATIENT
Start: 2024-03-26 | End: 2024-03-27 | Stop reason: HOSPADM

## 2024-03-26 RX ORDER — DIPHENHYDRAMINE HYDROCHLORIDE 50 MG/ML
50 INJECTION INTRAMUSCULAR; INTRAVENOUS
Status: DISCONTINUED | OUTPATIENT
Start: 2024-03-26 | End: 2024-03-27 | Stop reason: HOSPADM

## 2024-03-26 RX ORDER — SODIUM CHLORIDE 9 MG/ML
INJECTION, SOLUTION INTRAVENOUS CONTINUOUS
Status: CANCELLED | OUTPATIENT
Start: 2024-03-26

## 2024-03-26 RX ORDER — ACETAMINOPHEN 325 MG/1
650 TABLET ORAL
Status: CANCELLED | OUTPATIENT
Start: 2024-03-26

## 2024-03-26 RX ORDER — ALBUTEROL SULFATE 90 UG/1
4 AEROSOL, METERED RESPIRATORY (INHALATION) PRN
Status: CANCELLED | OUTPATIENT
Start: 2024-03-26

## 2024-03-26 RX ORDER — EPINEPHRINE 1 MG/ML
0.3 INJECTION, SOLUTION INTRAMUSCULAR; SUBCUTANEOUS PRN
Status: DISCONTINUED | OUTPATIENT
Start: 2024-03-26 | End: 2024-03-27 | Stop reason: HOSPADM

## 2024-03-26 RX ORDER — ONDANSETRON 2 MG/ML
8 INJECTION INTRAMUSCULAR; INTRAVENOUS
Status: CANCELLED | OUTPATIENT
Start: 2024-03-26

## 2024-03-26 RX ORDER — EPINEPHRINE 1 MG/ML
0.3 INJECTION, SOLUTION INTRAMUSCULAR; SUBCUTANEOUS PRN
Status: CANCELLED | OUTPATIENT
Start: 2024-03-26

## 2024-03-26 RX ORDER — DIPHENHYDRAMINE HYDROCHLORIDE 50 MG/ML
50 INJECTION INTRAMUSCULAR; INTRAVENOUS
Status: CANCELLED | OUTPATIENT
Start: 2024-03-26

## 2024-03-26 RX ORDER — ACETAMINOPHEN 325 MG/1
650 TABLET ORAL
Status: DISCONTINUED | OUTPATIENT
Start: 2024-03-26 | End: 2024-03-27 | Stop reason: HOSPADM

## 2024-03-26 RX ADMIN — THYROTROPIN ALFA 0.9 MG: 0.9 INJECTION, POWDER, FOR SOLUTION INTRAMUSCULAR at 13:49

## 2024-03-26 NOTE — PROGRESS NOTES
Problem: Safety - Adult  Goal: Free from fall injury  Outcome: Progressing   [facility] Progress Note  Date: March 26, 2024       Treatment: Thyrogen    Mr. Andrey Veronica arrived in no acute distress at 1330.        Assessment was unremarkable with no new concerns voiced. 2  Mr. Andrey Veronica's vitals for today's visit.   Patient Vitals for the past 12 hrs:   Temp Pulse Resp BP SpO2   03/26/24 1330 98.1 °F (36.7 °C) 74 16 123/69 98 %                Medications given:  Medications Administered         thyrotropin earline (THYROGEN) 0.9 mg in sterile water 1 mL injection Admin Date  03/26/2024 Action  Given Dose  0.9 mg Route  IntraMUSCular Administered By  Yanique Polanco RN            Mr. Andrey Veronica tolerated the treatment without complaints.   Mr. Andrey Veronica [facility] in stable condition at 1345.     Future Appointments   Date Time Provider Department Center   3/27/2024 10:00 AM [facility] NM CONSULT 1 [facility]   4/3/2024 12:00 PM [facility] NM RM 3 [facility]   7/1/2024  4:30 PM Doris Roberts MD RDE ARYA 332 BS AMB     Janet Morris, KRYSTIN  March 26, 2024  3:31 PM

## 2024-03-27 ENCOUNTER — HOSPITAL ENCOUNTER (OUTPATIENT)
Facility: HOSPITAL | Age: 34
Discharge: HOME | End: 2024-03-30
Attending: GENERAL ACUTE CARE HOSPITAL

## 2024-03-27 DIAGNOSIS — C73 FOLLICULAR THYROID CARCINOMA (HCC): ICD-10-CM

## 2024-03-27 PROCEDURE — 3430000000 HC RX DIAGNOSTIC RADIOPHARMACEUTICAL: Performed by: GENERAL ACUTE CARE HOSPITAL

## 2024-03-27 PROCEDURE — 79005 NUCLEAR RX ORAL ADMIN: CPT

## 2024-03-27 PROCEDURE — A9517 I131 IODIDE CAP, RX: HCPCS | Performed by: GENERAL ACUTE CARE HOSPITAL

## 2024-03-27 RX ADMIN — SODIUM IODIDE I 131 76.5 MILLICURIE: 1 CAPSULE, GELATIN COATED ORAL at 09:50

## 2024-04-02 DIAGNOSIS — C73 FOLLICULAR THYROID CARCINOMA (HCC): ICD-10-CM

## 2024-04-03 ENCOUNTER — HOSPITAL ENCOUNTER (OUTPATIENT)
Facility: HOSPITAL | Age: 34
Discharge: HOME | End: 2024-04-06
Attending: GENERAL ACUTE CARE HOSPITAL

## 2024-04-03 DIAGNOSIS — C73 FOLLICULAR THYROID CARCINOMA (HCC): ICD-10-CM

## 2024-04-03 PROCEDURE — 78018 THYROID MET IMAGING BODY: CPT

## 2024-04-19 RX ORDER — LEVOTHYROXINE SODIUM 0.2 MG/1
TABLET ORAL
Qty: 30 TABLET | Refills: 11 | Status: SHIPPED | OUTPATIENT
Start: 2024-04-19

## 2024-05-02 ENCOUNTER — HOSPITAL ENCOUNTER (OUTPATIENT)
Facility: HOSPITAL | Age: 34
Setting detail: SPECIMEN
Discharge: HOME | End: 2024-05-05

## 2024-05-02 DIAGNOSIS — C73 FOLLICULAR THYROID CARCINOMA (HCC): ICD-10-CM

## 2024-05-02 PROCEDURE — 86800 THYROGLOBULIN ANTIBODY: CPT

## 2024-05-02 PROCEDURE — 36415 COLL VENOUS BLD VENIPUNCTURE: CPT

## 2024-05-02 PROCEDURE — 84443 ASSAY THYROID STIM HORMONE: CPT

## 2024-05-02 PROCEDURE — 82330 ASSAY OF CALCIUM: CPT

## 2024-05-02 PROCEDURE — 84439 ASSAY OF FREE THYROXINE: CPT

## 2024-05-02 PROCEDURE — 80053 COMPREHEN METABOLIC PANEL: CPT

## 2024-05-02 PROCEDURE — 84432 ASSAY OF THYROGLOBULIN: CPT

## 2024-05-02 PROCEDURE — 85025 COMPLETE CBC W/AUTO DIFF WBC: CPT

## 2024-05-03 LAB
ALBUMIN SERPL-MCNC: 4.2 G/DL (ref 3.5–5)
ALBUMIN/GLOB SERPL: 1.1 (ref 1.1–2.2)
ALP SERPL-CCNC: 92 U/L (ref 45–117)
ALT SERPL-CCNC: 69 U/L (ref 12–78)
ANION GAP SERPL CALC-SCNC: 4 MMOL/L (ref 5–15)
AST SERPL-CCNC: 29 U/L (ref 15–37)
BASOPHILS # BLD: 0.1 K/UL (ref 0–0.1)
BASOPHILS NFR BLD: 1 % (ref 0–1)
BILIRUB SERPL-MCNC: 0.4 MG/DL (ref 0.2–1)
BUN SERPL-MCNC: 17 MG/DL (ref 6–20)
BUN/CREAT SERPL: 15 (ref 12–20)
CALCIUM SERPL-MCNC: 9.6 MG/DL (ref 8.5–10.1)
CHLORIDE SERPL-SCNC: 105 MMOL/L (ref 97–108)
CO2 SERPL-SCNC: 30 MMOL/L (ref 21–32)
CREAT SERPL-MCNC: 1.16 MG/DL (ref 0.7–1.3)
DIFFERENTIAL METHOD BLD: ABNORMAL
EOSINOPHIL # BLD: 0.1 K/UL (ref 0–0.4)
EOSINOPHIL NFR BLD: 2 % (ref 0–7)
ERYTHROCYTE [DISTWIDTH] IN BLOOD BY AUTOMATED COUNT: 13.5 % (ref 11.5–14.5)
GLOBULIN SER CALC-MCNC: 3.8 G/DL (ref 2–4)
GLUCOSE SERPL-MCNC: 144 MG/DL (ref 65–100)
HCT VFR BLD AUTO: 47.2 % (ref 36.6–50.3)
HGB BLD-MCNC: 15.8 G/DL (ref 12.1–17)
IMM GRANULOCYTES # BLD AUTO: 0.1 K/UL (ref 0–0.04)
IMM GRANULOCYTES NFR BLD AUTO: 1 % (ref 0–0.5)
LYMPHOCYTES # BLD: 2.2 K/UL (ref 0.8–3.5)
LYMPHOCYTES NFR BLD: 29 % (ref 12–49)
MCH RBC QN AUTO: 28.7 PG (ref 26–34)
MCHC RBC AUTO-ENTMCNC: 33.5 G/DL (ref 30–36.5)
MCV RBC AUTO: 85.8 FL (ref 80–99)
MONOCYTES # BLD: 0.7 K/UL (ref 0–1)
MONOCYTES NFR BLD: 10 % (ref 5–13)
NEUTS SEG # BLD: 4.4 K/UL (ref 1.8–8)
NEUTS SEG NFR BLD: 57 % (ref 32–75)
NRBC # BLD: 0 K/UL (ref 0–0.01)
NRBC BLD-RTO: 0 PER 100 WBC
PLATELET # BLD AUTO: 226 K/UL (ref 150–400)
PMV BLD AUTO: 10.7 FL (ref 8.9–12.9)
POTASSIUM SERPL-SCNC: 4 MMOL/L (ref 3.5–5.1)
PROT SERPL-MCNC: 8 G/DL (ref 6.4–8.2)
RBC # BLD AUTO: 5.5 M/UL (ref 4.1–5.7)
SODIUM SERPL-SCNC: 139 MMOL/L (ref 136–145)
T4 FREE SERPL-MCNC: 0.7 NG/DL (ref 0.8–1.5)
TSH SERPL DL<=0.05 MIU/L-ACNC: 17.3 UIU/ML (ref 0.36–3.74)
WBC # BLD AUTO: 7.5 K/UL (ref 4.1–11.1)

## 2024-05-05 LAB — CA-I SERPL ISE-MCNC: 5.1 MG/DL (ref 4.5–5.6)

## 2024-05-08 LAB
THYROGLOB AB SERPL-ACNC: <1 IU/ML (ref 0–0.9)
THYROGLOBULIN: 907.3 NG/ML (ref 1.4–29.2)

## 2024-05-09 DIAGNOSIS — E89.0 POSTPROCEDURAL HYPOTHYROIDISM: Primary | ICD-10-CM

## 2024-05-09 RX ORDER — LEVOTHYROXINE SODIUM 300 UG/1
300 TABLET ORAL DAILY
Qty: 30 TABLET | Refills: 3 | Status: SHIPPED | OUTPATIENT
Start: 2024-05-09

## 2024-06-10 ENCOUNTER — HOSPITAL ENCOUNTER (OUTPATIENT)
Facility: HOSPITAL | Age: 34
Setting detail: SPECIMEN
Discharge: HOME | End: 2024-06-13

## 2024-06-10 ENCOUNTER — NURSE ONLY (OUTPATIENT)
Age: 34
End: 2024-06-10

## 2024-06-10 ENCOUNTER — OFFICE VISIT (OUTPATIENT)
Age: 34
End: 2024-06-10

## 2024-06-10 DIAGNOSIS — E89.0 POSTPROCEDURAL HYPOTHYROIDISM: ICD-10-CM

## 2024-06-10 DIAGNOSIS — Z71.89 COUNSELING AND COORDINATION OF CARE: Primary | ICD-10-CM

## 2024-06-10 PROCEDURE — 84436 ASSAY OF TOTAL THYROXINE: CPT

## 2024-06-10 PROCEDURE — 84443 ASSAY THYROID STIM HORMONE: CPT

## 2024-06-10 PROCEDURE — 36415 COLL VENOUS BLD VENIPUNCTURE: CPT

## 2024-06-10 NOTE — PROGRESS NOTES
BS FA application has been completed for patient. Patient will submit completed application with supporting documents at [facility].

## 2024-06-10 NOTE — PROGRESS NOTES
Patient arrived for lab only appointment, per orders by Katie Gilmore. Labs drawn by Lisa Cabral., No complications. Labs collected: TSH, T4.  Charo Shea RN

## 2024-06-11 LAB
T4 SERPL-MCNC: 12 UG/DL (ref 4.5–12.1)
TSH SERPL DL<=0.05 MIU/L-ACNC: 0.51 UIU/ML (ref 0.36–3.74)

## 2024-06-20 ENCOUNTER — TELEPHONE (OUTPATIENT)
Age: 34
End: 2024-06-20

## 2024-06-20 NOTE — TELEPHONE ENCOUNTER
----- Message from Tasha Rivers RN sent at 6/20/2024  3:25 PM EDT -----  Regarding: RE: Referral to Dr. Todd  I spoke with the patient and he doesn't currently have health insurance but he would like to see Dr. Roberts one more time (he has a 7/1 appt) before switching.     ----- Message -----  From: Sunil Todd MD  Sent: 6/20/2024   9:11 AM EDT  To: Katie Gilmore MD; Doris Roberts MD; #  Subject: RE: Referral to Dr. Todd                      It appears he sees my partner, Dr. Roberts.  If he has insurance, he needs to stay with her but if he doesn't then I'm happy to see her.  I have cc'ed Dr. Roberts on this to clarify along with my nurse, Tasha.  Thanks!  ----- Message -----  From: Katei Gilmore MD  Sent: 6/20/2024   8:43 AM EDT  To: Sunil Todd MD; #  Subject: FW: Referral to Dr. Todd                      Do you make the appts with Dr Todd or is it the nurse he works with?    If it's you, can you please schedule this patient with Dr Todd.  Thanks.  ----- Message -----  From: Agatha Fraser MA  Sent: 6/20/2024   8:39 AM EDT  To: Katie Gilmore MD  Subject: Referral to Dr. Perez Gilmore, this patient came to see me and stated he goes to see an Endocrinologist and has to pay for the visits.  I mentioned to him about Dr. Todd, and he asked if you could please refer him to Dr. Todd.     Thank you.

## 2024-06-20 NOTE — TELEPHONE ENCOUNTER
I called the patient to verify if he has health insurance. Per patient he has previously received financial assistance but does not have health insurance. He would like to go to his appt with Dr. Roberts on 7/1 before switching if possible.     Let me know how I should proceed. Thanks!

## 2024-06-23 DIAGNOSIS — E89.0 POSTPROCEDURAL HYPOTHYROIDISM: ICD-10-CM

## 2024-06-24 RX ORDER — LEVOTHYROXINE SODIUM 300 UG/1
300 TABLET ORAL DAILY
Qty: 30 TABLET | Refills: 0 | Status: SHIPPED | OUTPATIENT
Start: 2024-06-24

## 2024-07-01 ENCOUNTER — OFFICE VISIT (OUTPATIENT)
Age: 34
End: 2024-07-01

## 2024-07-01 VITALS
HEIGHT: 75 IN | SYSTOLIC BLOOD PRESSURE: 110 MMHG | BODY MASS INDEX: 28.72 KG/M2 | RESPIRATION RATE: 17 BRPM | HEART RATE: 74 BPM | DIASTOLIC BLOOD PRESSURE: 74 MMHG | OXYGEN SATURATION: 97 % | WEIGHT: 231 LBS

## 2024-07-01 DIAGNOSIS — C73 FOLLICULAR THYROID CARCINOMA (HCC): Primary | ICD-10-CM

## 2024-07-01 DIAGNOSIS — E89.0 POSTOPERATIVE HYPOTHYROIDISM: ICD-10-CM

## 2024-07-01 PROCEDURE — 99214 OFFICE O/P EST MOD 30 MIN: CPT | Performed by: GENERAL ACUTE CARE HOSPITAL

## 2024-07-01 RX ORDER — LEVOTHYROXINE SODIUM 300 UG/1
300 TABLET ORAL DAILY
Qty: 90 TABLET | Refills: 1 | Status: SHIPPED | OUTPATIENT
Start: 2024-07-01

## 2024-07-01 NOTE — PATIENT INSTRUCTIONS
Continue on levothyroxine 300 mcg daily  Complete blood test test in 4 weeks   Complete neck ultrasound     Please have your  send the letter request to Fax [phone] and we will send letter that is requested to them    Our phone number [phone]    Levothyroxine medication instructions:  Please take levothyroxine with a glass of water only, on an empty stomach each morning, 1 hour prior to ingesting any other medications (including vitamins), food, coffee, tea, juice or any other beverages.   If you use antacids, medicine to treat high cholesterol (including cholestyramine, colesevelam, colestipol), orlistat, sevelamer, sucralfate, stomach medicine (including lansoprazole, omeprazole, pantoprazole), or any medicine that contains calcium or iron, please take it at least 4 hours before or 4 hours after you take levothyroxine.  Cottonseed meal, dietary fiber, soybean flour or walnuts may decrease the absorption of this medicine.  All of these can reduce the absorption of thyroid hormone tablets and result in fluctuating levels in the blood and on labs, affecting also how one feels.  Please do not eat grapefruit or drink grapefruit juice while you are using this medicine.  If you miss a dose you can take it as soon as you remember. However, if it is almost time for your next dose, wait until then and take a regular dose. Do not take extra medicine to make up for a missed dose.  Store the medicine in a closed container at room temperature, away from heat, moisture, and direct light. Please keep out of children's reach.  You may have to take this medicine for 6 to 8 weeks before your symptoms start to get better.

## 2024-07-01 NOTE — PROGRESS NOTES
Identified pt with two pt identifiers(name and ). Reviewed record in preparation for visit and have obtained necessary documentation. All patient medications has been reviewed.  Chief Complaint   Patient presents with    Follow-up    Thyroid Problem         Wt Readings from Last 3 Encounters:   24 104.8 kg (231 lb)   24 101.6 kg (224 lb)   24 103.5 kg (228 lb 3.2 oz)     Temp Readings from Last 3 Encounters:   24 97.7 °F (36.5 °C) (Temporal)   24 98.1 °F (36.7 °C) (Oral)   24 99.3 °F (37.4 °C) (Temporal)     BP Readings from Last 3 Encounters:   24 110/74   24 131/80   24 123/69     Pulse Readings from Last 3 Encounters:   24 74   24 68   24 74       \"Have you been to the ER, urgent care clinic since your last visit?  Hospitalized since your last visit?\"    NO    “Have you seen or consulted any other health care providers outside of [facility] since your last visit?”    NO      
with Dr Sunil Todd     ---------------------------------------------------------------------------------------------------------------------------------     HISTORY OF PRESENT ILLNESS:   Joss Veronica is a 34 y.o. male with a PMHx as noted below who was referred to our endocrinology clinic for evaluation of a thyroid nodule.    7/1/24   Denies new complaints, no compressive symptoms, feels energy is improved with levothyroxine 300mcg daily    2/6/24   Mr Balderas had total thyroidectomy on 12/14/2023 with dr Eubanks    FINAL PATHOLOGIC DIAGNOSIS     1. \"left perithyroidal lymph node\":  Follicular thyroid carcinoma   No associated lymphoid tissue identified     2. Thyroid, total thyroidectomy:        Follicular thyroid carcinoma, 13 cm   Anterior margin positive for carcinoma   Lymphovascular invasion is present   Parathyroid tissue present     TUMOR   -Unifocal    - Left lobe, Isthmus    - Tumor Size: 13 cm    - Histologic Type: Follicular carcinoma, widely invasive    - Angioinvasion (vascular invasion): Present    - Lymphatic Invasion: Present    - Extrathyroidal Extension: Not identified    - Margin Status: Carcinoma present at margin    -Margin(s) Involved by Carcinoma: Anterior    - Regional Lymph Node Status: no regional lymph nodes         submitted or found     DISTANT METASTASIS       Distant Site(s) Involved: Not applicable    PATHOLOGIC STAGE CLASSIFICATION (pTNM, AJCC 8th Edition)       pT Category: pT3a       pN Category: pN not assigned (no nodes submitted or found)        Labs 1/1/8/2024  TSH 2.73  Free T4 1.38  Thyroglobulin 1056            10/10/2023  Patient describes that since 04/2023 he developed compressive symptoms, although he noticed neck sweeling for long time but he is not sure exactly when, was referred by his PCP after completing thyroid ultrasound \" report says in process and not completed yet\".  He went to ER for diaphoresis and had CT showing retrosternal thyromegaly.    He

## 2024-07-26 DIAGNOSIS — C73 FOLLICULAR THYROID CARCINOMA (HCC): ICD-10-CM

## 2024-07-26 DIAGNOSIS — E89.0 POSTOPERATIVE HYPOTHYROIDISM: ICD-10-CM

## 2024-07-26 LAB
T4 FREE SERPL-MCNC: 1.4 NG/DL (ref 0.8–1.5)
TSH SERPL DL<=0.05 MIU/L-ACNC: 0.05 UIU/ML (ref 0.36–3.74)

## 2024-07-28 LAB
THYROGLOB AB SERPL-ACNC: <1 IU/ML (ref 0–0.9)
THYROGLOBULIN: 36.5 NG/ML (ref 1.4–29.2)

## 2024-07-29 ENCOUNTER — TELEPHONE (OUTPATIENT)
Age: 34
End: 2024-07-29

## 2024-09-05 DIAGNOSIS — C73 FOLLICULAR THYROID CARCINOMA (HCC): Primary | ICD-10-CM

## 2024-09-06 ENCOUNTER — TELEPHONE (OUTPATIENT)
Age: 34
End: 2024-09-06

## 2024-09-06 NOTE — TELEPHONE ENCOUNTER
Attempted to call patient x2 to schedule him for lab appt before her visit with Dr. Todd on 09/18. Voicemail left with the care a van callback number.

## 2024-09-13 ENCOUNTER — LAB (OUTPATIENT)
Age: 34
End: 2024-09-13

## 2024-09-13 ENCOUNTER — HOSPITAL ENCOUNTER (OUTPATIENT)
Facility: HOSPITAL | Age: 34
Setting detail: SPECIMEN
Discharge: HOME | End: 2024-09-16

## 2024-09-13 DIAGNOSIS — C73 FOLLICULAR THYROID CARCINOMA (HCC): Primary | ICD-10-CM

## 2024-09-13 LAB
T4 FREE SERPL-MCNC: 1.4 NG/DL (ref 0.8–1.5)
TSH SERPL DL<=0.05 MIU/L-ACNC: 0.08 UIU/ML (ref 0.36–3.74)

## 2024-09-13 PROCEDURE — 84432 ASSAY OF THYROGLOBULIN: CPT

## 2024-09-13 PROCEDURE — 86800 THYROGLOBULIN ANTIBODY: CPT

## 2024-09-13 PROCEDURE — 84443 ASSAY THYROID STIM HORMONE: CPT

## 2024-09-13 PROCEDURE — 84439 ASSAY OF FREE THYROXINE: CPT

## 2024-09-18 ENCOUNTER — OFFICE VISIT (OUTPATIENT)
Age: 34
End: 2024-09-18

## 2024-09-18 VITALS
TEMPERATURE: 98.2 F | SYSTOLIC BLOOD PRESSURE: 132 MMHG | HEART RATE: 71 BPM | OXYGEN SATURATION: 96 % | DIASTOLIC BLOOD PRESSURE: 74 MMHG | WEIGHT: 232.2 LBS | BODY MASS INDEX: 29.02 KG/M2

## 2024-09-18 DIAGNOSIS — C73 FOLLICULAR THYROID CARCINOMA (HCC): Primary | ICD-10-CM

## 2024-09-18 PROCEDURE — 99204 OFFICE O/P NEW MOD 45 MIN: CPT | Performed by: INTERNAL MEDICINE

## 2024-09-18 SDOH — ECONOMIC STABILITY: FOOD INSECURITY: WITHIN THE PAST 12 MONTHS, YOU WORRIED THAT YOUR FOOD WOULD RUN OUT BEFORE YOU GOT MONEY TO BUY MORE.: NEVER TRUE

## 2024-09-18 SDOH — ECONOMIC STABILITY: FOOD INSECURITY: WITHIN THE PAST 12 MONTHS, THE FOOD YOU BOUGHT JUST DIDN'T LAST AND YOU DIDN'T HAVE MONEY TO GET MORE.: NEVER TRUE

## 2024-09-18 SDOH — ECONOMIC STABILITY: INCOME INSECURITY: IN THE LAST 12 MONTHS, WAS THERE A TIME WHEN YOU WERE NOT ABLE TO PAY THE MORTGAGE OR RENT ON TIME?: NO

## 2024-09-18 SDOH — ECONOMIC STABILITY: INCOME INSECURITY: HOW HARD IS IT FOR YOU TO PAY FOR THE VERY BASICS LIKE FOOD, HOUSING, MEDICAL CARE, AND HEATING?: NOT VERY HARD

## 2024-09-18 ASSESSMENT — LIFESTYLE VARIABLES
HOW OFTEN DO YOU HAVE A DRINK CONTAINING ALCOHOL: NEVER
HOW MANY STANDARD DRINKS CONTAINING ALCOHOL DO YOU HAVE ON A TYPICAL DAY: PATIENT DOES NOT DRINK

## 2024-09-18 ASSESSMENT — PATIENT HEALTH QUESTIONNAIRE - PHQ9
SUM OF ALL RESPONSES TO PHQ QUESTIONS 1-9: 0
2. FEELING DOWN, DEPRESSED OR HOPELESS: NOT AT ALL
SUM OF ALL RESPONSES TO PHQ QUESTIONS 1-9: 0
SUM OF ALL RESPONSES TO PHQ QUESTIONS 1-9: 0
1. LITTLE INTEREST OR PLEASURE IN DOING THINGS: NOT AT ALL
SUM OF ALL RESPONSES TO PHQ9 QUESTIONS 1 & 2: 0
SUM OF ALL RESPONSES TO PHQ QUESTIONS 1-9: 0

## 2024-09-20 LAB
THYROGLOBULIN (ICMA): 32.8 NG/ML
THYROGLOBULIN (TG-RIA): ABNORMAL NG/ML
THYROGLOBULIN AB: <1 IU/ML

## 2024-10-28 ENCOUNTER — OFFICE VISIT (OUTPATIENT)
Age: 34
End: 2024-10-28

## 2024-10-28 VITALS
DIASTOLIC BLOOD PRESSURE: 73 MMHG | SYSTOLIC BLOOD PRESSURE: 113 MMHG | BODY MASS INDEX: 28.25 KG/M2 | HEART RATE: 75 BPM | WEIGHT: 226 LBS | OXYGEN SATURATION: 97 % | TEMPERATURE: 97.5 F

## 2024-10-28 DIAGNOSIS — C73 FOLLICULAR THYROID CARCINOMA (HCC): Primary | ICD-10-CM

## 2024-10-28 DIAGNOSIS — E89.0 STATUS POST TOTAL THYROIDECTOMY: ICD-10-CM

## 2024-10-28 PROCEDURE — 99213 OFFICE O/P EST LOW 20 MIN: CPT | Performed by: FAMILY MEDICINE

## 2024-10-28 ASSESSMENT — PATIENT HEALTH QUESTIONNAIRE - PHQ9
SUM OF ALL RESPONSES TO PHQ QUESTIONS 1-9: 0
SUM OF ALL RESPONSES TO PHQ QUESTIONS 1-9: 0
SUM OF ALL RESPONSES TO PHQ9 QUESTIONS 1 & 2: 0
2. FEELING DOWN, DEPRESSED OR HOPELESS: NOT AT ALL
SUM OF ALL RESPONSES TO PHQ QUESTIONS 1-9: 0
1. LITTLE INTEREST OR PLEASURE IN DOING THINGS: NOT AT ALL
SUM OF ALL RESPONSES TO PHQ QUESTIONS 1-9: 0

## 2024-10-28 ASSESSMENT — ENCOUNTER SYMPTOMS
CONSTIPATION: 0
DIARRHEA: 0
NAUSEA: 0
VOMITING: 0
COUGH: 0
ABDOMINAL PAIN: 0
SHORTNESS OF BREATH: 0

## 2024-10-28 NOTE — PROGRESS NOTES
Joss Veronica is a 34 y.o. male   Chief Complaint   Patient presents with   • Other     Needs to apply to acces now         ASSESSMENT AND PLAN:    1. Follicular thyroid carcinoma (HCC)  2. Status post total thyroidectomy  Pt does not wish to change providers and [facility] does not have US.  Will see if different imaging could be appropriate ([facility] does CT/MRI), but it's likely he'll have to go through the care-card process.      SUBJECTIVE:    HPI:  Joss Veronica is a 34 y.o. male who presents to request a referral to apply for access now so that he can continue with his surveillance for thyroid cancer to facilitate the necessary studies.  Studies due in January (US thyroid) He has followup with Dr Todd scheduled for January as well- through the care-a-van.  He heard AN was easier/better once it was finished.  He is worried about having to wait for the bill as you have to with the Care-Card.  Currently taking levothyroxine 300mcg daily.  No other concerns today.  Already got his flu shot.    Review of Systems   Constitutional:  Negative for chills, fatigue and fever.   Eyes:  Negative for visual disturbance.   Respiratory:  Negative for cough and shortness of breath.    Cardiovascular:  Negative for chest pain and palpitations.   Gastrointestinal:  Negative for abdominal pain, constipation, diarrhea, nausea and vomiting.   Genitourinary:  Negative for dysuria.   Neurological:  Negative for dizziness and headaches.       /73 (Site: Right Upper Arm, Position: Sitting, Cuff Size: Large Adult)   Pulse 75   Temp 97.5 °F (36.4 °C) (Temporal)   Wt 102.5 kg (226 lb)   SpO2 97%   BMI 28.25 kg/m²     Physical Exam  Constitutional:       General: He is not in acute distress.     Appearance: Normal appearance.   Pulmonary:      Effort: Pulmonary effort is normal.   Neurological:      Mental Status: He is alert and oriented to person, place, and time.   Psychiatric:         Mood and Affect: Mood normal.

## 2024-10-28 NOTE — PROGRESS NOTES
Pt's name and  verified. AVS provided. Pt informed Dr Gilmore will review POC with Dr. Todd and send message through [employer] with recommendations. Pt verbalizes understanding. Time allowed for questions, no questions at this time. Karely Romano RN

## 2024-10-28 NOTE — PROGRESS NOTES
Coordination of Care  1. Have you been to the ER, urgent care clinic since your last visit?  Hospitalized since your last visit? no    2. Have you seen or consulted any other health care providers outside of the [facility] since your last visit?  Include any pap smears or colon screening. no    Does the patient need refills? yes    Learning Assessment Complete? yes  Depression Screening complete in the past 12 months? yes

## 2024-10-28 NOTE — PATIENT INSTRUCTIONS
Access Now doesn't cover ultrasounds.  I will check with Dr. Todd if a CT would be okay.   If it is, I'll put it in. If not, he'll have to go through the care card process. I'll send a [email or web address] message when I Know.

## 2024-10-29 ENCOUNTER — TELEPHONE (OUTPATIENT)
Age: 34
End: 2024-10-29

## 2024-10-29 NOTE — TELEPHONE ENCOUNTER
----- Message from Dr. Sunil Todd MD sent at 10/15/2024  2:29 PM EDT -----  Regarding: RE: ultrasound  The 1st week of January would be great.  Thanks!  ----- Message -----  From: Tasha Rivers RN  Sent: 10/15/2024   2:27 PM EDT  To: Sunil Todd MD  Subject: RE: ultrasound                                   Hi Dr. Todd,     Do you want this ultrasound scheduled as soon as possible or closer to January?    Thanks!  ----- Message -----  From: Sunil Todd MD  Sent: 9/28/2024   8:09 AM EDT  To: Tasha Rivers RN  Subject: ultrasound                                       He will need a neck ultrasound set up for January prior to his visit with me on 1/15/25.  Thanks!

## 2024-12-06 NOTE — TELEPHONE ENCOUNTER
Called patient to go over ultrasound details and schedule a lab appt. Name and  confirmed. Patient confirmed understanding. We also went over the care card process.

## 2025-01-02 ENCOUNTER — TELEPHONE (OUTPATIENT)
Age: 35
End: 2025-01-02

## 2025-01-02 NOTE — TELEPHONE ENCOUNTER
Tc to the pt responding to a [employer] message in his chart from him he was asking about the instructions he received for the [place], he wanted to know if the Dr had to write him a signed letter to take to his [place] appt. I sent a [employer] message to him that the order they needed was already in his chart signed by the Dr. I called the pt to make sure the pt had gotten the message about the order is already signed and in his chart. He does not need to take them the order. He needs to take his photo ID. He should tell them he does not have insurance, and that he needs to apply for FA, if he does not already have the care card. The pt had verified his name, and  prior to any information shared. He verbalized understanding. Renu Mcconnell RN

## 2025-01-06 ENCOUNTER — HOSPITAL ENCOUNTER (OUTPATIENT)
Facility: HOSPITAL | Age: 35
Discharge: HOME | End: 2025-01-09
Attending: INTERNAL MEDICINE

## 2025-01-06 DIAGNOSIS — C73 FOLLICULAR THYROID CARCINOMA (HCC): ICD-10-CM

## 2025-01-06 PROCEDURE — 76536 US EXAM OF HEAD AND NECK: CPT

## 2025-01-08 ENCOUNTER — HOSPITAL ENCOUNTER (OUTPATIENT)
Facility: HOSPITAL | Age: 35
Setting detail: SPECIMEN
Discharge: HOME | End: 2025-01-11

## 2025-01-08 LAB
T4 FREE SERPL-MCNC: 1.7 NG/DL (ref 0.8–1.5)
TSH SERPL DL<=0.05 MIU/L-ACNC: 0.02 UIU/ML (ref 0.36–3.74)

## 2025-01-08 PROCEDURE — 84432 ASSAY OF THYROGLOBULIN: CPT

## 2025-01-08 PROCEDURE — 86800 THYROGLOBULIN ANTIBODY: CPT

## 2025-01-08 PROCEDURE — 84443 ASSAY THYROID STIM HORMONE: CPT

## 2025-01-08 PROCEDURE — 36415 COLL VENOUS BLD VENIPUNCTURE: CPT

## 2025-01-08 PROCEDURE — 84439 ASSAY OF FREE THYROXINE: CPT

## 2025-01-13 LAB
THYROGLOBULIN (ICMA): 17.4 NG/ML
THYROGLOBULIN (TG-RIA): NORMAL NG/ML
THYROGLOBULIN AB: <1 IU/ML

## 2025-01-15 ENCOUNTER — OFFICE VISIT (OUTPATIENT)
Age: 35
End: 2025-01-15

## 2025-01-15 VITALS
SYSTOLIC BLOOD PRESSURE: 133 MMHG | DIASTOLIC BLOOD PRESSURE: 77 MMHG | OXYGEN SATURATION: 100 % | TEMPERATURE: 99.2 F | WEIGHT: 232.4 LBS | HEART RATE: 83 BPM | BODY MASS INDEX: 29.05 KG/M2

## 2025-01-15 DIAGNOSIS — C73 FOLLICULAR THYROID CARCINOMA (HCC): Primary | ICD-10-CM

## 2025-01-15 PROCEDURE — 99214 OFFICE O/P EST MOD 30 MIN: CPT | Performed by: INTERNAL MEDICINE

## 2025-01-15 RX ORDER — LEVOTHYROXINE SODIUM 300 UG/1
300 TABLET ORAL DAILY
Qty: 90 TABLET | Refills: 3 | Status: SHIPPED | OUTPATIENT
Start: 2025-01-15

## 2025-01-15 ASSESSMENT — PATIENT HEALTH QUESTIONNAIRE - PHQ9
SUM OF ALL RESPONSES TO PHQ9 QUESTIONS 1 & 2: 0
SUM OF ALL RESPONSES TO PHQ QUESTIONS 1-9: 0
1. LITTLE INTEREST OR PLEASURE IN DOING THINGS: NOT AT ALL
SUM OF ALL RESPONSES TO PHQ QUESTIONS 1-9: 0
SUM OF ALL RESPONSES TO PHQ QUESTIONS 1-9: 0
2. FEELING DOWN, DEPRESSED OR HOPELESS: NOT AT ALL
SUM OF ALL RESPONSES TO PHQ QUESTIONS 1-9: 0

## 2025-01-15 NOTE — PROGRESS NOTES
\"Have you been to the ER, urgent care clinic since your last visit?  Hospitalized since your last visit?\"    NO    “Have you seen or consulted any other health care providers outside our system since your last visit?”    NO         \

## 2025-01-15 NOTE — PROGRESS NOTES
Chief Complaint   Patient presents with    Follow-up     Thyroid F/u      History of Present Illness: Joss Veronica is a 34 y.o. male here for follow up of thyroid.  Weight stable since last visit in 9/24.  Compliant with levothyroxine 300 mcg daily and energy is good on current dose.  No chest pain or palpitations or tremors.  Bowels are regular.  No heat or cold intolerance.  Hasn't noticed any new lumps in his neck.  He states he likely will need a letter for immigration in March to help him stay in the [place] to help monitor his thyroid cancer in the future and I told him to send me a [employer] message when this is needed.      History of Present Illness        Current Outpatient Medications   Medication Sig    levothyroxine (SYNTHROID) 300 MCG tablet Take 1 tablet by mouth Daily     No current facility-administered medications for this visit.     Allergies   Allergen Reactions    Seasonal      Nasal drainage       Review of Systems: PER HPI    Physical Examination:  Blood pressure 133/77, pulse 83, temperature 99.2 °F (37.3 °C), temperature source Temporal, weight 105.4 kg (232 lb 6.4 oz), SpO2 100%.  General: pleasant, no distress, good eye contact   Neck: well healed inferior neck scar, no palpable thyroid tissue, masses or lymph nodes  Cardiovascular: regular, normal rate, nl s1 and s2, no m/r/g   Respiratory: clear to auscultation bilaterally   Integumentary: skin is normal, no edema  Neurological: reflexes 2+ at biceps, no tremors  Psychiatric: normal mood and affect    Data Reviewed:   Component      Latest Ref Rng 1/8/2025   Thyroglobulin Ab      IU/mL <1.0    Thyroglobulin (ICMA)      ng/mL 17.4    Thyroglobulin (TG-VIOLETTE)      ng/mL Comment    T4 Free      0.8 - 1.5 NG/DL 1.7 (H)    TSH, 3rd Generation      0.36 - 3.74 uIU/mL 0.02 (L)       US HEAD NECK SOFT TISSUE  Order: [id number]  Status: Final result       Visible to patient: Yes (seen)       Next appt: None       Dx: Follicular thyroid carcinoma

## 2025-01-15 NOTE — PATIENT INSTRUCTIONS
1) Clark TSH esta perfecta.  Sigue tomando levothyroxine 300 mcg diario.    2) Clark thyroglobulin (nivel de cancer) esta mejorando.    3) Clark ultrasonida no tiene evidencia de cancer.    4) Mandame izabella mensaje cuando necesita izabella carta para imigracion.

## 2025-04-30 RX ORDER — LEVOTHYROXINE SODIUM 300 UG/1
300 TABLET ORAL DAILY
Qty: 90 TABLET | Refills: 3 | Status: SHIPPED | OUTPATIENT
Start: 2025-04-30

## 2025-05-15 ENCOUNTER — HOSPITAL ENCOUNTER (OUTPATIENT)
Facility: HOSPITAL | Age: 35
Setting detail: SPECIMEN
Discharge: HOME | End: 2025-05-18

## 2025-05-15 ENCOUNTER — LAB (OUTPATIENT)
Age: 35
End: 2025-05-15

## 2025-05-15 DIAGNOSIS — C73 FOLLICULAR THYROID CARCINOMA (HCC): ICD-10-CM

## 2025-05-15 LAB
T4 FREE SERPL-MCNC: 1.6 NG/DL (ref 0.8–1.5)
TSH SERPL DL<=0.05 MIU/L-ACNC: <0.01 UIU/ML (ref 0.36–3.74)

## 2025-05-15 PROCEDURE — 84443 ASSAY THYROID STIM HORMONE: CPT

## 2025-05-15 PROCEDURE — 84439 ASSAY OF FREE THYROXINE: CPT

## 2025-05-15 PROCEDURE — 84432 ASSAY OF THYROGLOBULIN: CPT

## 2025-05-15 PROCEDURE — 86800 THYROGLOBULIN ANTIBODY: CPT

## 2025-05-15 NOTE — PROGRESS NOTES
Patient presented to clinic for lab collection. Name and  verified. Labs obtained: T4 Free, TSH, and Comprhensive Tyroglobulin . Patient tolerated procedure well. Jennifer Flores RN

## 2025-05-21 ENCOUNTER — RESULTS FOLLOW-UP (OUTPATIENT)
Age: 35
End: 2025-05-21

## 2025-05-21 ENCOUNTER — OFFICE VISIT (OUTPATIENT)
Age: 35
End: 2025-05-21

## 2025-05-21 VITALS
TEMPERATURE: 98.3 F | DIASTOLIC BLOOD PRESSURE: 79 MMHG | OXYGEN SATURATION: 97 % | WEIGHT: 232 LBS | HEART RATE: 76 BPM | BODY MASS INDEX: 29 KG/M2 | SYSTOLIC BLOOD PRESSURE: 135 MMHG

## 2025-05-21 DIAGNOSIS — C73 FOLLICULAR THYROID CARCINOMA (HCC): Primary | ICD-10-CM

## 2025-05-21 LAB
THYROGLOBULIN (ICMA): 12.5 NG/ML
THYROGLOBULIN (TG-RIA): NORMAL NG/ML
THYROGLOBULIN AB: <1 IU/ML

## 2025-05-21 PROCEDURE — 99214 OFFICE O/P EST MOD 30 MIN: CPT | Performed by: INTERNAL MEDICINE

## 2025-05-21 NOTE — PROGRESS NOTES
Chief Complaint   Patient presents with    Thyroid Problem     History of Present Illness: Joss Veronica is a 34 y.o. male here for follow up of thyroid.  Weight stable since last visit in 1/25.      History of Present Illness  The patient is a 34-year-old male here for follow-up of thyroid cancer.    He has been compliant with his levothyroxine 300 mcg one tab daily in the morning and has not missed any doses. He has felt a little more tired since January 2025 but reports no chest pain, heart racing, or tremors. He has no loose bowels and does not feel excessively warm. He has not had any trouble sleeping or increased anxiety. He has not noticed any new lumps or bumps in his neck. So far, he has not needed a letter for immigration but will reach out if one is needed in the future.      Current Outpatient Medications   Medication Sig    levothyroxine (SYNTHROID) 300 MCG tablet Take 1 tablet by mouth Daily     No current facility-administered medications for this visit.     Allergies   Allergen Reactions    Environmental/Seasonal      Nasal drainage       Review of Systems: PER HPI    Physical Examination:  Blood pressure 135/79, pulse 76, temperature 98.3 °F (36.8 °C), temperature source Temporal, weight 105.2 kg (232 lb), SpO2 97%.  General: pleasant, no distress, good eye contact   Neck: well healed inferior neck scar, no palpable thyroid tissue, masses or lymph nodes  Cardiovascular: regular, normal rate, nl s1 and s2, no m/r/g   Respiratory: clear to auscultation bilaterally   Integumentary: skin is normal, no edema  Psychiatric: normal mood and affect    Data Reviewed:   Component      Latest Ref Rng 5/15/2025   TSH, 3rd Generation      0.36 - 3.74 uIU/mL <0.01 (L)    T4 Free      0.8 - 1.5 NG/DL 1.6 (H)           Assessment/Plan:     1. Follicular thyroid carcinoma (HCC): He is s/p total thyroidectomy in 12/23 with Dr. Eubanks for a 13 cm follicular cancer in the left lobe and isthmus that was widely

## 2025-05-21 NOTE — PROGRESS NOTES
Name and  confirmed w/ patient. An After Visit Summary was provided and all discharge instructions were reviewed with the patient including: f/up appt. Time for questions and answers provided, patient verbalized understanding. Patient discharged from clinic in stable condition. LAINEY  Belen assisted with interpretation.

## 2025-05-21 NOTE — PROGRESS NOTES
Chief Complaint   Patient presents with    Thyroid Problem     /79 (BP Site: Right Upper Arm, Patient Position: Sitting, BP Cuff Size: Medium Adult)   Pulse 76   Temp 98.3 °F (36.8 °C) (Temporal)   Wt 105.2 kg (232 lb)   SpO2 97%   BMI 29.00 kg/m²

## 2025-05-21 NOTE — PATIENT INSTRUCTIONS
1) Voy a dyana izabella mensaje por mychart con mcgee thyroglobulin (nivel de cancer) y un plan.

## 2025-09-03 ENCOUNTER — HOSPITAL ENCOUNTER (OUTPATIENT)
Facility: HOSPITAL | Age: 35
Setting detail: SPECIMEN
Discharge: HOME | End: 2025-09-06

## 2025-09-03 ENCOUNTER — LAB (OUTPATIENT)
Age: 35
End: 2025-09-03

## 2025-09-03 DIAGNOSIS — C73 FOLLICULAR THYROID CARCINOMA (HCC): ICD-10-CM

## 2025-09-03 PROCEDURE — 84439 ASSAY OF FREE THYROXINE: CPT

## 2025-09-03 PROCEDURE — 84443 ASSAY THYROID STIM HORMONE: CPT

## 2025-09-03 PROCEDURE — 84432 ASSAY OF THYROGLOBULIN: CPT

## 2025-09-03 PROCEDURE — 86800 THYROGLOBULIN ANTIBODY: CPT

## 2025-09-04 LAB
T4 FREE SERPL-MCNC: 1.5 NG/DL (ref 0.9–1.6)
TSH, 3RD GENERATION: 0.01 UIU/ML (ref 0.27–4.2)

## (undated) DEVICE — SUTURE VCRL SZ 4-0 L18IN ABSRB UD L13MM P-3 3/8 CIR PRIM J494H

## (undated) DEVICE — CLIP LIG M BLU TI HRT SHP WIRE HORZ 600 PER BX

## (undated) DEVICE — HYPODERMIC SAFETY NEEDLE: Brand: MONOJECT

## (undated) DEVICE — SYRINGE MED 10ML LUERLOCK TIP W/O SFTY DISP

## (undated) DEVICE — KIT,1200CC CANISTER,3/16"X6' TUBING: Brand: MEDLINE INDUSTRIES, INC.

## (undated) DEVICE — BLADE ES ELASTOMERIC COAT INSUL DURABLE BEND UPTO 90DEG

## (undated) DEVICE — GOWN,SIRUS,NONRNF,SETINSLV,2XL,18/CS: Brand: MEDLINE

## (undated) DEVICE — MAGNETIC INSTR DRAPE 20X16: Brand: MEDLINE INDUSTRIES, INC.

## (undated) DEVICE — SHEET,T,THYROID,STERILE: Brand: MEDLINE

## (undated) DEVICE — SPONGE GZ W4XL4IN COT RADPQ HIGHLY ABSRB

## (undated) DEVICE — GLOVE ORANGE PI 7 1/2   MSG9075

## (undated) DEVICE — APPLICATOR MEDICATED 10.5 CC SOLUTION HI LT ORNG CHLORAPREP

## (undated) DEVICE — LIQUIBAND RAPID ADHESIVE 36/CS 0.8ML: Brand: MEDLINE

## (undated) DEVICE — SOLUTION IRRIG 1000ML 0.9% SOD CHL USP POUR PLAS BTL

## (undated) DEVICE — SUTURE VCRL SZ 2-0 L27IN ABSRB UD L26MM SH 1/2 CIR J417H

## (undated) DEVICE — SEALER LAP SM L18.8CM OPN JAW HAND/FOOT SWCH FORCETRIAD

## (undated) DEVICE — SUTURE PERMA-HAND SZ 2-0 L30IN NONABSORBABLE BLK L26MM SH K833H

## (undated) DEVICE — SUTURE PERMAHAND SZ 2-0 L30IN NONABSORBABLE BLK SILK W/O A305H

## (undated) DEVICE — SUTURE VCRL SZ 3-0 L27IN ABSRB UD L26MM SH 1/2 CIR J416H

## (undated) DEVICE — MAJOR LAPAROTOMY-MRMC: Brand: MEDLINE INDUSTRIES, INC.

## (undated) DEVICE — AGENT HEMSTAT W4XL8IN OXIDIZED REGENERATED CELOS ABSRB

## (undated) DEVICE — PROBE 8225101 5PK STD PRASS FL TIP ROHS